# Patient Record
Sex: MALE | Race: OTHER | HISPANIC OR LATINO | Employment: FULL TIME | URBAN - METROPOLITAN AREA
[De-identification: names, ages, dates, MRNs, and addresses within clinical notes are randomized per-mention and may not be internally consistent; named-entity substitution may affect disease eponyms.]

---

## 2024-02-24 ENCOUNTER — HOSPITAL ENCOUNTER (EMERGENCY)
Facility: HOSPITAL | Age: 34
Discharge: HOME | End: 2024-02-25
Attending: EMERGENCY MEDICINE

## 2024-02-24 DIAGNOSIS — B37.49 CANDIDAL BALANO-POSTHITIS: ICD-10-CM

## 2024-02-24 DIAGNOSIS — E11.9 DIABETES MELLITUS, NEW ONSET: Primary | ICD-10-CM

## 2024-02-24 LAB
ALBUMIN SERPL-MCNC: 4.5 G/DL (ref 3.5–5)
ALBUMIN/GLOB SERPL: 1.6 RATIO (ref 1.1–2)
ALP SERPL-CCNC: 152 UNIT/L (ref 40–150)
ALT SERPL-CCNC: 32 UNIT/L (ref 0–55)
APPEARANCE UR: ABNORMAL
AST SERPL-CCNC: 19 UNIT/L (ref 5–34)
BACTERIA #/AREA URNS AUTO: ABNORMAL /HPF
BASOPHILS # BLD AUTO: 0.02 X10(3)/MCL
BASOPHILS NFR BLD AUTO: 0.4 %
BILIRUB SERPL-MCNC: 0.8 MG/DL
BILIRUB UR QL STRIP.AUTO: NEGATIVE
BUN SERPL-MCNC: 10.3 MG/DL (ref 8.9–20.6)
CALCIUM SERPL-MCNC: 9.4 MG/DL (ref 8.4–10.2)
CHLORIDE SERPL-SCNC: 94 MMOL/L (ref 98–107)
CO2 SERPL-SCNC: 23 MMOL/L (ref 22–29)
COLOR UR AUTO: ABNORMAL
CREAT SERPL-MCNC: 1.56 MG/DL (ref 0.73–1.18)
EOSINOPHIL # BLD AUTO: 0.17 X10(3)/MCL (ref 0–0.9)
EOSINOPHIL NFR BLD AUTO: 3 %
ERYTHROCYTE [DISTWIDTH] IN BLOOD BY AUTOMATED COUNT: 12.1 % (ref 11.5–17)
GFR SERPLBLD CREATININE-BSD FMLA CKD-EPI: 59 MLS/MIN/1.73/M2
GLOBULIN SER-MCNC: 2.8 GM/DL (ref 2.4–3.5)
GLUCOSE SERPL-MCNC: 766 MG/DL (ref 74–100)
GLUCOSE UR QL STRIP.AUTO: ABNORMAL
HCT VFR BLD AUTO: 40.2 % (ref 42–52)
HGB BLD-MCNC: 14.1 G/DL (ref 14–18)
HYALINE CASTS #/AREA URNS LPF: ABNORMAL /LPF
IMM GRANULOCYTES # BLD AUTO: 0.02 X10(3)/MCL (ref 0–0.04)
IMM GRANULOCYTES NFR BLD AUTO: 0.4 %
KETONES UR QL STRIP.AUTO: ABNORMAL
LEUKOCYTE ESTERASE UR QL STRIP.AUTO: NEGATIVE
LYMPHOCYTES # BLD AUTO: 2.03 X10(3)/MCL (ref 0.6–4.6)
LYMPHOCYTES NFR BLD AUTO: 36.1 %
MCH RBC QN AUTO: 29.8 PG (ref 27–31)
MCHC RBC AUTO-ENTMCNC: 35.1 G/DL (ref 33–36)
MCV RBC AUTO: 85 FL (ref 80–94)
MONOCYTES # BLD AUTO: 0.37 X10(3)/MCL (ref 0.1–1.3)
MONOCYTES NFR BLD AUTO: 6.6 %
NEUTROPHILS # BLD AUTO: 3.01 X10(3)/MCL (ref 2.1–9.2)
NEUTROPHILS NFR BLD AUTO: 53.5 %
NITRITE UR QL STRIP.AUTO: NEGATIVE
NRBC BLD AUTO-RTO: 0 %
PH UR STRIP.AUTO: 6.5 [PH]
PLATELET # BLD AUTO: 223 X10(3)/MCL (ref 130–400)
PMV BLD AUTO: 10.5 FL (ref 7.4–10.4)
POCT GLUCOSE: >500 MG/DL (ref 70–110)
POTASSIUM SERPL-SCNC: 4 MMOL/L (ref 3.5–5.1)
PROT SERPL-MCNC: 7.3 GM/DL (ref 6.4–8.3)
PROT UR QL STRIP.AUTO: NEGATIVE
RBC # BLD AUTO: 4.73 X10(6)/MCL (ref 4.7–6.1)
RBC #/AREA URNS AUTO: ABNORMAL /HPF
RBC UR QL AUTO: NEGATIVE
SODIUM SERPL-SCNC: 129 MMOL/L (ref 136–145)
SP GR UR STRIP.AUTO: 1.03 (ref 1–1.03)
SQUAMOUS #/AREA URNS LPF: ABNORMAL /HPF
UROBILINOGEN UR STRIP-ACNC: NORMAL
WBC # SPEC AUTO: 5.62 X10(3)/MCL (ref 4.5–11.5)
WBC #/AREA URNS AUTO: ABNORMAL /HPF

## 2024-02-24 PROCEDURE — 96374 THER/PROPH/DIAG INJ IV PUSH: CPT

## 2024-02-24 PROCEDURE — 25000003 PHARM REV CODE 250: Performed by: NURSE PRACTITIONER

## 2024-02-24 PROCEDURE — 96361 HYDRATE IV INFUSION ADD-ON: CPT

## 2024-02-24 PROCEDURE — 81001 URINALYSIS AUTO W/SCOPE: CPT | Performed by: NURSE PRACTITIONER

## 2024-02-24 PROCEDURE — 85025 COMPLETE CBC W/AUTO DIFF WBC: CPT | Performed by: NURSE PRACTITIONER

## 2024-02-24 PROCEDURE — 80053 COMPREHEN METABOLIC PANEL: CPT | Performed by: NURSE PRACTITIONER

## 2024-02-24 PROCEDURE — 63600175 PHARM REV CODE 636 W HCPCS: Performed by: NURSE PRACTITIONER

## 2024-02-24 PROCEDURE — 99284 EMERGENCY DEPT VISIT MOD MDM: CPT | Mod: 25

## 2024-02-24 PROCEDURE — 82010 KETONE BODYS QUAN: CPT | Performed by: NURSE PRACTITIONER

## 2024-02-24 PROCEDURE — 82962 GLUCOSE BLOOD TEST: CPT

## 2024-02-24 RX ADMIN — SODIUM CHLORIDE 1000 ML: 9 INJECTION, SOLUTION INTRAVENOUS at 11:02

## 2024-02-24 RX ADMIN — HUMAN INSULIN 10 UNITS: 100 INJECTION, SOLUTION SUBCUTANEOUS at 11:02

## 2024-02-25 VITALS
SYSTOLIC BLOOD PRESSURE: 154 MMHG | OXYGEN SATURATION: 99 % | BODY MASS INDEX: 25.71 KG/M2 | RESPIRATION RATE: 17 BRPM | WEIGHT: 160 LBS | HEIGHT: 66 IN | DIASTOLIC BLOOD PRESSURE: 88 MMHG | HEART RATE: 64 BPM | TEMPERATURE: 98 F

## 2024-02-25 LAB
B-OH-BUTYR SERPL-MCNC: 1.5 MMOL/L
HOLD SPECIMEN: NORMAL
POCT GLUCOSE: 199 MG/DL (ref 70–110)

## 2024-02-25 PROCEDURE — 82962 GLUCOSE BLOOD TEST: CPT

## 2024-02-25 RX ORDER — CLOTRIMAZOLE 1 %
CREAM (GRAM) TOPICAL 2 TIMES DAILY
Qty: 28 G | Refills: 1 | Status: SHIPPED | OUTPATIENT
Start: 2024-02-25 | End: 2024-03-17

## 2024-02-25 RX ORDER — METFORMIN HYDROCHLORIDE 500 MG/1
500 TABLET ORAL 2 TIMES DAILY WITH MEALS
Qty: 60 TABLET | Refills: 0 | Status: SHIPPED | OUTPATIENT
Start: 2024-02-25 | End: 2024-04-10 | Stop reason: SDUPTHER

## 2024-02-25 NOTE — ED PROVIDER NOTES
Encounter Date: 2/24/2024       History     Chief Complaint   Patient presents with    Testicle Pain     Pt presents to ed with reports of testicular pain x1 month, dysuria, leg cramps, and dehydration. Pt reports drinking 10-15 bottles of water daily but unable to stay hydrated. Pt states dry mouth and trouble unrinating.      The patient presents with increased thirst and urinary frequency for at least the last month. He also reports a rash to foreskin of penis. He denies testicular pain. He denies abdominal pain, nausea, vomiting, fever, and chills. Video  used for encounter.      Review of patient's allergies indicates:  No Known Allergies  History reviewed. No pertinent past medical history.  History reviewed. No pertinent surgical history.  History reviewed. No pertinent family history.     Review of Systems   Constitutional:  Negative for fever.   HENT:  Negative for sore throat.    Respiratory:  Negative for shortness of breath.    Cardiovascular:  Negative for chest pain.   Gastrointestinal:  Negative for nausea.   Genitourinary:  Positive for frequency. Negative for dysuria.   Musculoskeletal:  Negative for back pain.   Skin:  Positive for rash.   Neurological:  Negative for weakness.   Hematological:  Does not bruise/bleed easily.   All other systems reviewed and are negative.      Physical Exam     Initial Vitals [02/24/24 2059]   BP Pulse Resp Temp SpO2   (!) 166/92 62 18 97.8 °F (36.6 °C) 99 %      MAP       --         Physical Exam    Nursing note and vitals reviewed.  Constitutional: He appears well-developed and well-nourished.   HENT:   Head: Normocephalic and atraumatic.   Right Ear: Tympanic membrane normal.   Left Ear: Tympanic membrane normal.   Nose: Nose normal.   Mouth/Throat: Uvula is midline, oropharynx is clear and moist and mucous membranes are normal.   Neck: Neck supple.   Normal range of motion.  Cardiovascular:  Normal rate, regular rhythm, normal heart sounds and intact  distal pulses.           Pulmonary/Chest: Effort normal and breath sounds normal. He has no decreased breath sounds.   Abdominal: Abdomen is soft and flat. Bowel sounds are normal. There is no abdominal tenderness.   Genitourinary:    Testes normal.   Cremasteric reflex is present.    Genitourinary Comments: Erythematous rash to foreskin and glans penis     Musculoskeletal:         General: Normal range of motion.      Cervical back: Normal range of motion and neck supple.     Neurological: He is alert and oriented to person, place, and time. He has normal strength.   Skin: Skin is warm and dry.   Psychiatric: He has a normal mood and affect.         ED Course   Procedures  Labs Reviewed   COMPREHENSIVE METABOLIC PANEL - Abnormal; Notable for the following components:       Result Value    Sodium Level 129 (*)     Chloride 94 (*)     Glucose Level 766 (*)     Creatinine 1.56 (*)     Alkaline Phosphatase 152 (*)     All other components within normal limits   URINALYSIS, REFLEX TO URINE CULTURE - Abnormal; Notable for the following components:    Appearance, UA Hazy (*)     Specific Gravity, UA 1.032 (*)     Glucose, UA 4+ (*)     Ketones, UA 1+ (*)     All other components within normal limits   CBC WITH DIFFERENTIAL - Abnormal; Notable for the following components:    Hct 40.2 (*)     MPV 10.5 (*)     All other components within normal limits   POCT GLUCOSE - Abnormal; Notable for the following components:    POCT Glucose >500 (*)     All other components within normal limits   POCT GLUCOSE - Abnormal; Notable for the following components:    POCT Glucose 199 (*)     All other components within normal limits   CBC W/ AUTO DIFFERENTIAL    Narrative:     The following orders were created for panel order CBC auto differential.  Procedure                               Abnormality         Status                     ---------                               -----------         ------                     CBC with  Differential[[id number]]       Abnormal            Final result                 Please view results for these tests on the individual orders.   EXTRA TUBES    Narrative:     The following orders were created for panel order EXTRA TUBES.  Procedure                               Abnormality         Status                     ---------                               -----------         ------                     Light Blue Top Hold[[id number]]                             Final result               Light Green Top Hold[[id number]]                            Final result               Gold Top Hold[[id number]]                                   Final result                 Please view results for these tests on the individual orders.   LIGHT BLUE TOP HOLD   LIGHT GREEN TOP HOLD   GOLD TOP HOLD   BETA - HYDROXYBUTYRATE, SERUM   POCT GLUCOSE, HAND-HELD DEVICE          Imaging Results    None          Medications   sodium chloride 0.9% bolus 1,000 mL 1,000 mL (0 mLs Intravenous Stopped 2/25/24 0026)   insulin regular injection 10 Units 0.1 mL (10 Units Intravenous Given 2/24/24 2336)     Medical Decision Making  The patient presents with increased thirst and urinary frequency for at least the last month. He also reports a rash to foreskin of penis. He denies testicular pain. He denies abdominal pain, nausea, vomiting, fever, and chills. Video  used for encounter.    Blood glucose 766 - decreased to 199 after 1L NS and 10u regular insulin. Anion gap 12. Patient feels much better and wishes to go home. Dr Salguero (ER staff) consulted and he advised ok to discharge patient with close followup. Will send urgent referral to medicine clinic and start patient on metformin. Strict return to ER precautions given. Patient in full agreement with plan.    Amount and/or Complexity of Data Reviewed  Labs: ordered.    Risk  OTC drugs.               ED Course as of 02/25/24 0055   Sun Feb 25, 2024   0054 Glucose(!!): 766 [RB]    0054 POCT Glucose(!): 199 [RB]      ED Course User Index  [RB] Jermaine Pierce ACNP                           Clinical Impression:  Final diagnoses:  [E11.9] Diabetes mellitus, new onset (Primary)  [B37.49] Candidal balano-posthitis          ED Disposition Condition    Discharge Stable          ED Prescriptions       Medication Sig Dispense Start Date End Date Auth. Provider    metFORMIN (GLUCOPHAGE) 500 MG tablet Take 1 tablet (500 mg total) by mouth 2 (two) times daily with meals. 60 tablet 2/25/2024 3/26/2024 Jermaine Pierce ACNP    clotrimazole (LOTRIMIN) 1 % cream Apply topically 2 (two) times daily. Apply to affected area 2 times daily for 21 days 28 g 2/25/2024 3/17/2024 Jermaine Pierce ACNP          Follow-up Information       Follow up With Specialties Details Why Contact Info    referral sent to medicine clinic per request for a primary care provider        [facility] Emergency Medicine  If symptoms worsen [address] [facility] [place] [address] [phone]             Jermaine Pierce ACNP  02/25/24 0055

## 2024-04-10 ENCOUNTER — HOSPITAL ENCOUNTER (EMERGENCY)
Facility: HOSPITAL | Age: 34
Discharge: HOME | End: 2024-04-10
Attending: INTERNAL MEDICINE

## 2024-04-10 VITALS
HEART RATE: 61 BPM | OXYGEN SATURATION: 99 % | BODY MASS INDEX: 24.8 KG/M2 | RESPIRATION RATE: 16 BRPM | SYSTOLIC BLOOD PRESSURE: 101 MMHG | WEIGHT: 154.31 LBS | HEIGHT: 66 IN | DIASTOLIC BLOOD PRESSURE: 80 MMHG | TEMPERATURE: 98 F

## 2024-04-10 DIAGNOSIS — E11.65 UNCONTROLLED TYPE 2 DIABETES MELLITUS WITH HYPERGLYCEMIA: Primary | ICD-10-CM

## 2024-04-10 LAB
ALBUMIN SERPL-MCNC: 4.2 G/DL (ref 3.5–5)
ALBUMIN/GLOB SERPL: 1.5 RATIO (ref 1.1–2)
ALP SERPL-CCNC: 117 UNIT/L (ref 40–150)
ALT SERPL-CCNC: 19 UNIT/L (ref 0–55)
APPEARANCE UR: CLEAR
AST SERPL-CCNC: 15 UNIT/L (ref 5–34)
BACTERIA #/AREA URNS AUTO: ABNORMAL /HPF
BILIRUB SERPL-MCNC: 0.7 MG/DL
BILIRUB UR QL STRIP.AUTO: NEGATIVE
BUN SERPL-MCNC: 11 MG/DL (ref 8.9–20.6)
CALCIUM SERPL-MCNC: 9.8 MG/DL (ref 8.4–10.2)
CHLORIDE SERPL-SCNC: 98 MMOL/L (ref 98–107)
CO2 SERPL-SCNC: 25 MMOL/L (ref 22–29)
COLOR UR AUTO: COLORLESS
CREAT SERPL-MCNC: 1.31 MG/DL (ref 0.73–1.18)
EST. AVERAGE GLUCOSE BLD GHB EST-MCNC: ABNORMAL MG/DL
GFR SERPLBLD CREATININE-BSD FMLA CKD-EPI: >60 MLS/MIN/1.73/M2
GLOBULIN SER-MCNC: 2.8 GM/DL (ref 2.4–3.5)
GLUCOSE SERPL-MCNC: 687 MG/DL (ref 74–100)
GLUCOSE UR QL STRIP.AUTO: ABNORMAL
HBA1C MFR BLD: >14 %
HOLD SPECIMEN: NORMAL
KETONES UR QL STRIP.AUTO: ABNORMAL
LEUKOCYTE ESTERASE UR QL STRIP.AUTO: NEGATIVE
NITRITE UR QL STRIP.AUTO: NEGATIVE
PH UR STRIP.AUTO: 6.5 [PH]
POCT GLUCOSE: 271 MG/DL (ref 70–110)
POCT GLUCOSE: 446 MG/DL (ref 70–110)
POCT GLUCOSE: >500 MG/DL (ref 70–110)
POTASSIUM SERPL-SCNC: 4.1 MMOL/L (ref 3.5–5.1)
PROT SERPL-MCNC: 7 GM/DL (ref 6.4–8.3)
PROT UR QL STRIP.AUTO: NEGATIVE
RBC #/AREA URNS AUTO: ABNORMAL /HPF
RBC UR QL AUTO: NEGATIVE
SODIUM SERPL-SCNC: 132 MMOL/L (ref 136–145)
SP GR UR STRIP.AUTO: 1.03 (ref 1–1.03)
SQUAMOUS #/AREA URNS LPF: ABNORMAL /HPF
UROBILINOGEN UR STRIP-ACNC: NORMAL
WBC #/AREA URNS AUTO: ABNORMAL /HPF

## 2024-04-10 PROCEDURE — 99284 EMERGENCY DEPT VISIT MOD MDM: CPT | Mod: 25

## 2024-04-10 PROCEDURE — 82962 GLUCOSE BLOOD TEST: CPT

## 2024-04-10 PROCEDURE — 96372 THER/PROPH/DIAG INJ SC/IM: CPT | Performed by: INTERNAL MEDICINE

## 2024-04-10 PROCEDURE — 96361 HYDRATE IV INFUSION ADD-ON: CPT

## 2024-04-10 PROCEDURE — 80053 COMPREHEN METABOLIC PANEL: CPT | Performed by: INTERNAL MEDICINE

## 2024-04-10 PROCEDURE — 63600175 PHARM REV CODE 636 W HCPCS: Performed by: INTERNAL MEDICINE

## 2024-04-10 PROCEDURE — 96374 THER/PROPH/DIAG INJ IV PUSH: CPT

## 2024-04-10 PROCEDURE — 81001 URINALYSIS AUTO W/SCOPE: CPT | Performed by: INTERNAL MEDICINE

## 2024-04-10 PROCEDURE — 83036 HEMOGLOBIN GLYCOSYLATED A1C: CPT | Performed by: INTERNAL MEDICINE

## 2024-04-10 RX ORDER — METFORMIN HYDROCHLORIDE 500 MG/1
500 TABLET ORAL 2 TIMES DAILY WITH MEALS
Qty: 60 TABLET | Refills: 3 | Status: SHIPPED | OUTPATIENT
Start: 2024-04-10 | End: 2024-08-08

## 2024-04-10 RX ORDER — INSULIN GLARGINE 100 [IU]/ML
8 INJECTION, SOLUTION SUBCUTANEOUS NIGHTLY
Qty: 12 ML | Refills: 4 | Status: SHIPPED | OUTPATIENT
Start: 2024-04-10 | End: 2025-04-10

## 2024-04-10 RX ORDER — BLOOD SUGAR DIAGNOSTIC
1 STRIP MISCELLANEOUS NIGHTLY
Qty: 100 EACH | Refills: 4 | Status: SHIPPED | OUTPATIENT
Start: 2024-04-10

## 2024-04-10 RX ORDER — INSULIN ASPART 100 [IU]/ML
10 INJECTION, SOLUTION INTRAVENOUS; SUBCUTANEOUS
Status: COMPLETED | OUTPATIENT
Start: 2024-04-10 | End: 2024-04-10

## 2024-04-10 RX ADMIN — SODIUM CHLORIDE, POTASSIUM CHLORIDE, SODIUM LACTATE AND CALCIUM CHLORIDE 1000 ML: 600; 310; 30; 20 INJECTION, SOLUTION INTRAVENOUS at 02:04

## 2024-04-10 RX ADMIN — HUMAN INSULIN 5 UNITS: 100 INJECTION, SOLUTION SUBCUTANEOUS at 02:04

## 2024-04-10 RX ADMIN — INSULIN ASPART 10 UNITS: 100 INJECTION, SOLUTION INTRAVENOUS; SUBCUTANEOUS at 02:04

## 2024-04-10 NOTE — ED PROVIDER NOTES
Encounter Date: 4/10/2024       History     Chief Complaint   Patient presents with    Hyperglycemia    Urinary Frequency     Pt reports increase in thirst and urinary frequency. Pt is out of diabetes medication. CBG: >500     Presents with polydipsia and polyuria for the last few days, states Dx with DM last month, R/O his medication 4 days ago.     The history is provided by the patient.     Review of patient's allergies indicates:  No Known Allergies  No past medical history on file.  No past surgical history on file.  No family history on file.     Review of Systems   Constitutional:  Positive for unexpected weight change.   Endocrine: Positive for polydipsia and polyuria.   Neurological:  Positive for weakness.   All other systems reviewed and are negative.      Physical Exam     Initial Vitals [04/10/24 1329]   BP Pulse Resp Temp SpO2   (!) 144/80 84 18 98.2 °F (36.8 °C) 100 %      MAP       --         Physical Exam    Nursing note and vitals reviewed.  Constitutional: He appears well-developed and well-nourished. No distress.   HENT:   Head: Normocephalic and atraumatic.   Mouth/Throat: Oropharynx is clear and moist.   Eyes: Conjunctivae and EOM are normal. Pupils are equal, round, and reactive to light.   Neck: Neck supple. No thyromegaly present. No JVD present.   Normal range of motion.  Cardiovascular:  Normal rate, regular rhythm, normal heart sounds and intact distal pulses.           Pulmonary/Chest: Breath sounds normal. No respiratory distress.   Abdominal: Abdomen is soft. Bowel sounds are normal. He exhibits no distension. There is no abdominal tenderness. There is no rebound and no guarding.   Musculoskeletal:         General: No edema. Normal range of motion.      Cervical back: Normal range of motion and neck supple.     Neurological: He is alert and oriented to person, place, and time. He has normal strength. GCS score is 15. GCS eye subscore is 4. GCS verbal subscore is 5. GCS motor subscore  is 6.   Skin: Skin is warm and dry. No rash noted.   Psychiatric: His behavior is normal.         ED Course   Procedures  Labs Reviewed   COMPREHENSIVE METABOLIC PANEL - Abnormal; Notable for the following components:       Result Value    Sodium Level 132 (*)     Glucose Level 687 (*)     Creatinine 1.31 (*)     All other components within normal limits   HEMOGLOBIN A1C - Abnormal; Notable for the following components:    Hemoglobin A1c >14.0 (*)     All other components within normal limits   POCT GLUCOSE - Abnormal; Notable for the following components:    POCT Glucose >500 (*)     All other components within normal limits   POCT GLUCOSE - Abnormal; Notable for the following components:    POCT Glucose 446 (*)     All other components within normal limits   POCT GLUCOSE - Abnormal; Notable for the following components:    POCT Glucose 271 (*)     All other components within normal limits   URINALYSIS, REFLEX TO URINE CULTURE   EXTRA TUBES    Narrative:     The following orders were created for panel order EXTRA TUBES.  Procedure                               Abnormality         Status                     ---------                               -----------         ------                     Light Blue Top Hold[[id number]]                             In process                 Lavender Top Hold[[id number]]                               In process                 Gold Top Hold[[id number]]                                   In process                 Pink Top Hold[[id number]]                                   In process                   Please view results for these tests on the individual orders.   LIGHT BLUE TOP HOLD   LAVENDER TOP HOLD   GOLD TOP HOLD   PINK TOP HOLD   POCT GLUCOSE, HAND-HELD DEVICE          Imaging Results    None          Medications   lactated ringers bolus 1,000 mL (0 mLs Intravenous Stopped 4/10/24 1433)   insulin aspart U-100 injection 10 Units (10 Units Subcutaneous Given 4/10/24 1403)  "  lactated ringers bolus 1,000 mL (0 mLs Intravenous Stopped 4/10/24 1523)   insulin regular injection 5 Units 0.05 mL (5 Units Intravenous Given 4/10/24 1452)     Medical Decision Making  Amount and/or Complexity of Data Reviewed  Labs: ordered. Decision-making details documented in ED Course.    Risk  OTC drugs.  Prescription drug management.      Additional MDM:   Differential Diagnosis:   U-DM, DKA, Hyperosmolar state, Infection, among others                                      Clinical Impression:  Final diagnoses:  [E11.65] Uncontrolled type 2 diabetes mellitus with hyperglycemia (Primary)          ED Disposition Condition    Discharge Stable          ED Prescriptions       Medication Sig Dispense Start Date End Date Auth. Provider    metFORMIN (GLUCOPHAGE) 500 MG tablet Take 1 tablet (500 mg total) by mouth 2 (two) times daily with meals. 60 tablet 4/10/2024 8/8/2024 Raul De Paz MD    insulin (LANTUS SOLOSTAR U-100 INSULIN) glargine 100 units/mL SubQ pen Inject 8 Units into the skin every evening. 12 mL 4/10/2024 4/10/2025 Raul De Paz MD    pen needle, diabetic (BD ULTRA-FINE MICRO PEN NEEDLE) 32 gauge x 1/4" Ndle 1 each by Misc.(Non-Drug; Combo Route) route every evening. 100 each 4/10/2024 -- Raul De Paz MD          Follow-up Information       Follow up With Specialties Details Why Contact Info Additional Information    [facility] Emergency Medicine  If symptoms worsen [address] [facility] [address] [phone]     [facility] Internal Medicine Schedule an appointment as soon as possible for a visit in 1 month  [address] [facility] [address] [phone] Internal Medicine Clinic Entrance #1             Raul De Paz MD  04/10/24 2647    "

## 2024-12-09 ENCOUNTER — HOSPITAL ENCOUNTER (INPATIENT)
Facility: HOSPITAL | Age: 34
LOS: 2 days | Discharge: HOME | DRG: 638 | End: 2024-12-12
Attending: FAMILY MEDICINE | Admitting: INTERNAL MEDICINE

## 2024-12-09 DIAGNOSIS — N17.9 AKI (ACUTE KIDNEY INJURY): ICD-10-CM

## 2024-12-09 DIAGNOSIS — E11.65 UNCONTROLLED TYPE 2 DIABETES MELLITUS WITH HYPERGLYCEMIA: Primary | ICD-10-CM

## 2024-12-09 DIAGNOSIS — E11.00 HYPEROSMOLAR HYPERGLYCEMIC STATE (HHS): ICD-10-CM

## 2024-12-09 DIAGNOSIS — E86.0 DEHYDRATION: ICD-10-CM

## 2024-12-09 DIAGNOSIS — E11.00 TYPE 2 DIABETES MELLITUS WITH HYPEROSMOLAR HYPERGLYCEMIC STATE (HHS): ICD-10-CM

## 2024-12-09 LAB
A-ADO2 BLOOD GAS (OHS): 12 MMHG
ALBUMIN SERPL-MCNC: 4.6 G/DL (ref 3.5–5)
ALBUMIN/GLOB SERPL: 1.4 RATIO (ref 1.1–2)
ALLENS TEST BLOOD GAS (OHS): YES
ALP SERPL-CCNC: 187 UNIT/L (ref 40–150)
ALT SERPL-CCNC: 27 UNIT/L (ref 0–55)
ANION GAP SERPL CALC-SCNC: 24 MEQ/L
AST SERPL-CCNC: 15 UNIT/L (ref 5–34)
B-OH-BUTYR SERPL-MCNC: 7 MMOL/L
BACTERIA #/AREA URNS AUTO: ABNORMAL /HPF
BASE EXCESS BLD CALC-SCNC: -5.2 MMOL/L (ref -2–2)
BASOPHILS # BLD AUTO: 0.05 X10(3)/MCL
BASOPHILS NFR BLD AUTO: 0.8 %
BILIRUB SERPL-MCNC: 0.7 MG/DL
BILIRUB UR QL STRIP.AUTO: NEGATIVE
BLOOD GAS SAMPLE TYPE (OHS): ABNORMAL
BUN SERPL-MCNC: 20.7 MG/DL (ref 8.9–20.6)
CALCIUM SERPL-MCNC: 10.8 MG/DL (ref 8.4–10.2)
CHLORIDE SERPL-SCNC: 87 MMOL/L (ref 98–107)
CLARITY UR: CLEAR
CO2 BLDA-SCNC: 21.1 MMOL/L (ref 22–26)
CO2 SERPL-SCNC: 20 MMOL/L (ref 22–29)
COHGB MFR BLDA: 0.3 % (ref 0.5–1.5)
COLOR UR AUTO: COLORLESS
CREAT SERPL-MCNC: 1.91 MG/DL (ref 0.72–1.25)
CREAT/UREA NIT SERPL: 11
DRAWN BY BLOOD GAS (OHS): ABNORMAL
EOSINOPHIL # BLD AUTO: 0.02 X10(3)/MCL (ref 0–0.9)
EOSINOPHIL NFR BLD AUTO: 0.3 %
ERYTHROCYTE [DISTWIDTH] IN BLOOD BY AUTOMATED COUNT: 11.9 % (ref 11.5–17)
EST. AVERAGE GLUCOSE BLD GHB EST-MCNC: ABNORMAL MG/DL
FLUAV AG UPPER RESP QL IA.RAPID: NOT DETECTED
FLUBV AG UPPER RESP QL IA.RAPID: NOT DETECTED
GAS PNL BLD: 103 MMHG
GFR SERPLBLD CREATININE-BSD FMLA CKD-EPI: 46 ML/MIN/1.73/M2
GLOBULIN SER-MCNC: 3.3 GM/DL (ref 2.4–3.5)
GLUCOSE SERPL-MCNC: 925 MG/DL (ref 74–100)
GLUCOSE UR QL STRIP: ABNORMAL
HBA1C MFR BLD: >14 %
HCO3 BLDA-SCNC: 20 MMOL/L (ref 22–26)
HCT VFR BLD AUTO: 46.5 % (ref 42–52)
HGB BLD-MCNC: 16.2 G/DL (ref 14–18)
HGB UR QL STRIP: NEGATIVE
HYALINE CASTS #/AREA URNS LPF: ABNORMAL /LPF
IMM GRANULOCYTES # BLD AUTO: 0.02 X10(3)/MCL (ref 0–0.04)
IMM GRANULOCYTES NFR BLD AUTO: 0.3 %
INHALED O2 CONCENTRATION: 21 %
KETONES UR QL STRIP: ABNORMAL
LEUKOCYTE ESTERASE UR QL STRIP: NEGATIVE
LIPASE SERPL-CCNC: 25 U/L
LYMPHOCYTES # BLD AUTO: 2.08 X10(3)/MCL (ref 0.6–4.6)
LYMPHOCYTES NFR BLD AUTO: 33.7 %
MAGNESIUM SERPL-MCNC: 2.4 MG/DL (ref 1.6–2.6)
MCH RBC QN AUTO: 29.9 PG (ref 27–31)
MCHC RBC AUTO-ENTMCNC: 34.8 G/DL (ref 33–36)
MCV RBC AUTO: 85.8 FL (ref 80–94)
METHGB MFR BLDA: 0.5 % (ref 0–1.5)
MONOCYTES # BLD AUTO: 0.55 X10(3)/MCL (ref 0.1–1.3)
MONOCYTES NFR BLD AUTO: 8.9 %
NEUTROPHILS # BLD AUTO: 3.45 X10(3)/MCL (ref 2.1–9.2)
NEUTROPHILS NFR BLD AUTO: 56 %
NITRITE UR QL STRIP: NEGATIVE
NRBC BLD AUTO-RTO: 0 %
O2 HB BLOOD GAS (OHS): 95.2 % (ref 94–100)
OXYHGB MFR BLDA: 14.4 G/DL (ref 12–18)
PCO2 BLDA: 37 MMHG (ref 35–45)
PH BLDA: 7.37 [PH] (ref 7.35–7.45)
PH UR STRIP: 5 [PH]
PLATELET # BLD AUTO: 282 X10(3)/MCL (ref 130–400)
PMV BLD AUTO: 10.1 FL (ref 7.4–10.4)
PO2 BLDA: 91 MMHG (ref 75–100)
POCT GLUCOSE: >500 MG/DL (ref 70–110)
POTASSIUM SERPL-SCNC: 4.5 MMOL/L (ref 3.5–5.1)
PROT SERPL-MCNC: 7.9 GM/DL (ref 6.4–8.3)
PROT UR QL STRIP: NEGATIVE
RBC # BLD AUTO: 5.42 X10(6)/MCL (ref 4.7–6.1)
RBC #/AREA URNS AUTO: ABNORMAL /HPF
RSV A 5' UTR RNA NPH QL NAA+PROBE: NOT DETECTED
SAMPLE SITE BLOOD GAS (OHS): ABNORMAL
SAO2 % BLDA: 103 %
SARS-COV-2 RNA RESP QL NAA+PROBE: NOT DETECTED
SODIUM SERPL-SCNC: 131 MMOL/L (ref 136–145)
SP GR UR STRIP.AUTO: 1.03 (ref 1–1.03)
SQUAMOUS #/AREA URNS LPF: ABNORMAL /HPF
UROBILINOGEN UR STRIP-ACNC: NORMAL
WBC # BLD AUTO: 6.17 X10(3)/MCL (ref 4.5–11.5)
WBC #/AREA URNS AUTO: ABNORMAL /HPF

## 2024-12-09 PROCEDURE — 94761 N-INVAS EAR/PLS OXIMETRY MLT: CPT | Mod: XB

## 2024-12-09 PROCEDURE — 96361 HYDRATE IV INFUSION ADD-ON: CPT

## 2024-12-09 PROCEDURE — 63600175 PHARM REV CODE 636 W HCPCS: Performed by: FAMILY MEDICINE

## 2024-12-09 PROCEDURE — 99900035 HC TECH TIME PER 15 MIN (STAT)

## 2024-12-09 PROCEDURE — 82010 KETONE BODYS QUAN: CPT | Performed by: FAMILY MEDICINE

## 2024-12-09 PROCEDURE — 81001 URINALYSIS AUTO W/SCOPE: CPT | Performed by: FAMILY MEDICINE

## 2024-12-09 PROCEDURE — 99285 EMERGENCY DEPT VISIT HI MDM: CPT | Mod: 25

## 2024-12-09 PROCEDURE — 83690 ASSAY OF LIPASE: CPT | Performed by: FAMILY MEDICINE

## 2024-12-09 PROCEDURE — 83735 ASSAY OF MAGNESIUM: CPT | Performed by: FAMILY MEDICINE

## 2024-12-09 PROCEDURE — 36600 WITHDRAWAL OF ARTERIAL BLOOD: CPT

## 2024-12-09 PROCEDURE — 82962 GLUCOSE BLOOD TEST: CPT

## 2024-12-09 PROCEDURE — 85025 COMPLETE CBC W/AUTO DIFF WBC: CPT | Performed by: FAMILY MEDICINE

## 2024-12-09 PROCEDURE — 83036 HEMOGLOBIN GLYCOSYLATED A1C: CPT | Performed by: FAMILY MEDICINE

## 2024-12-09 PROCEDURE — 82803 BLOOD GASES ANY COMBINATION: CPT

## 2024-12-09 PROCEDURE — 0241U COVID/RSV/FLU A&B PCR: CPT | Performed by: FAMILY MEDICINE

## 2024-12-09 PROCEDURE — 80053 COMPREHEN METABOLIC PANEL: CPT | Performed by: FAMILY MEDICINE

## 2024-12-09 PROCEDURE — 96360 HYDRATION IV INFUSION INIT: CPT

## 2024-12-09 PROCEDURE — 25000003 PHARM REV CODE 250: Performed by: FAMILY MEDICINE

## 2024-12-09 RX ORDER — SODIUM CHLORIDE 0.9 % (FLUSH) 0.9 %
10 SYRINGE (ML) INJECTION
Status: DISCONTINUED | OUTPATIENT
Start: 2024-12-10 | End: 2024-12-10

## 2024-12-09 RX ORDER — POTASSIUM CHLORIDE 7.45 MG/ML
40 INJECTION INTRAVENOUS
Status: DISCONTINUED | OUTPATIENT
Start: 2024-12-10 | End: 2024-12-12 | Stop reason: HOSPADM

## 2024-12-09 RX ORDER — SODIUM CHLORIDE 9 MG/ML
1000 INJECTION, SOLUTION INTRAVENOUS CONTINUOUS
Status: ACTIVE | OUTPATIENT
Start: 2024-12-10 | End: 2024-12-10

## 2024-12-09 RX ORDER — POTASSIUM CHLORIDE 7.45 MG/ML
60 INJECTION INTRAVENOUS
Status: DISCONTINUED | OUTPATIENT
Start: 2024-12-10 | End: 2024-12-12 | Stop reason: HOSPADM

## 2024-12-09 RX ORDER — DEXTROSE MONOHYDRATE AND SODIUM CHLORIDE 5; .45 G/100ML; G/100ML
INJECTION, SOLUTION INTRAVENOUS CONTINUOUS PRN
Status: DISCONTINUED | OUTPATIENT
Start: 2024-12-10 | End: 2024-12-10

## 2024-12-09 RX ORDER — POTASSIUM CHLORIDE 7.45 MG/ML
80 INJECTION INTRAVENOUS
Status: DISCONTINUED | OUTPATIENT
Start: 2024-12-10 | End: 2024-12-12 | Stop reason: HOSPADM

## 2024-12-09 RX ADMIN — HUMAN INSULIN 6 UNITS: 100 INJECTION, SOLUTION SUBCUTANEOUS at 10:12

## 2024-12-09 RX ADMIN — SODIUM CHLORIDE 1000 ML: 9 INJECTION, SOLUTION INTRAVENOUS at 10:12

## 2024-12-09 RX ADMIN — SODIUM CHLORIDE 1000 ML: 9 INJECTION, SOLUTION INTRAVENOUS at 09:12

## 2024-12-10 PROBLEM — R13.10 DYSPHAGIA: Status: ACTIVE | Noted: 2024-12-10

## 2024-12-10 PROBLEM — R63.4 WEIGHT LOSS: Status: ACTIVE | Noted: 2024-12-10

## 2024-12-10 PROBLEM — E11.00 HYPEROSMOLAR HYPERGLYCEMIC STATE (HHS): Status: ACTIVE | Noted: 2024-12-10

## 2024-12-10 PROBLEM — E44.0 MODERATE MALNUTRITION: Status: ACTIVE | Noted: 2024-12-10

## 2024-12-10 PROBLEM — E11.9 TYPE 2 DIABETES MELLITUS WITHOUT COMPLICATION, WITHOUT LONG-TERM CURRENT USE OF INSULIN: Status: ACTIVE | Noted: 2024-12-10

## 2024-12-10 LAB
ANION GAP SERPL CALC-SCNC: 10 MEQ/L
ANION GAP SERPL CALC-SCNC: 10 MEQ/L
ANION GAP SERPL CALC-SCNC: 4 MEQ/L
ANION GAP SERPL CALC-SCNC: 8 MEQ/L
BASOPHILS # BLD AUTO: 0.04 X10(3)/MCL
BASOPHILS NFR BLD AUTO: 0.6 %
BUN SERPL-MCNC: 13.1 MG/DL (ref 8.9–20.6)
BUN SERPL-MCNC: 13.1 MG/DL (ref 8.9–20.6)
BUN SERPL-MCNC: 13.5 MG/DL (ref 8.9–20.6)
BUN SERPL-MCNC: 13.8 MG/DL (ref 8.9–20.6)
CALCIUM SERPL-MCNC: 9.1 MG/DL (ref 8.4–10.2)
CALCIUM SERPL-MCNC: 9.2 MG/DL (ref 8.4–10.2)
CALCIUM SERPL-MCNC: 9.2 MG/DL (ref 8.4–10.2)
CALCIUM SERPL-MCNC: 9.3 MG/DL (ref 8.4–10.2)
CHLORIDE SERPL-SCNC: 106 MMOL/L (ref 98–107)
CHLORIDE SERPL-SCNC: 107 MMOL/L (ref 98–107)
CHLORIDE SERPL-SCNC: 107 MMOL/L (ref 98–107)
CHLORIDE SERPL-SCNC: 108 MMOL/L (ref 98–107)
CO2 SERPL-SCNC: 27 MMOL/L (ref 22–29)
CO2 SERPL-SCNC: 28 MMOL/L (ref 22–29)
CO2 SERPL-SCNC: 28 MMOL/L (ref 22–29)
CO2 SERPL-SCNC: 31 MMOL/L (ref 22–29)
CREAT SERPL-MCNC: 0.86 MG/DL (ref 0.72–1.25)
CREAT SERPL-MCNC: 0.96 MG/DL (ref 0.72–1.25)
CREAT SERPL-MCNC: 1.02 MG/DL (ref 0.72–1.25)
CREAT SERPL-MCNC: 1.06 MG/DL (ref 0.72–1.25)
CREAT/UREA NIT SERPL: 12
CREAT/UREA NIT SERPL: 14
CREAT/UREA NIT SERPL: 14
CREAT/UREA NIT SERPL: 15
EOSINOPHIL # BLD AUTO: 0.04 X10(3)/MCL (ref 0–0.9)
EOSINOPHIL NFR BLD AUTO: 0.6 %
ERYTHROCYTE [DISTWIDTH] IN BLOOD BY AUTOMATED COUNT: 11.8 % (ref 11.5–17)
GFR SERPLBLD CREATININE-BSD FMLA CKD-EPI: >60 ML/MIN/1.73/M2
GLUCOSE SERPL-MCNC: 155 MG/DL (ref 74–100)
GLUCOSE SERPL-MCNC: 179 MG/DL (ref 74–100)
GLUCOSE SERPL-MCNC: 220 MG/DL (ref 74–100)
GLUCOSE SERPL-MCNC: 260 MG/DL (ref 74–100)
HCT VFR BLD AUTO: 39.1 % (ref 42–52)
HGB BLD-MCNC: 14.4 G/DL (ref 14–18)
HOLD SPECIMEN: NORMAL
IMM GRANULOCYTES # BLD AUTO: 0.03 X10(3)/MCL (ref 0–0.04)
IMM GRANULOCYTES NFR BLD AUTO: 0.4 %
LYMPHOCYTES # BLD AUTO: 2.63 X10(3)/MCL (ref 0.6–4.6)
LYMPHOCYTES NFR BLD AUTO: 38 %
MCH RBC QN AUTO: 30.9 PG (ref 27–31)
MCHC RBC AUTO-ENTMCNC: 36.8 G/DL (ref 33–36)
MCV RBC AUTO: 83.9 FL (ref 80–94)
MONOCYTES # BLD AUTO: 0.53 X10(3)/MCL (ref 0.1–1.3)
MONOCYTES NFR BLD AUTO: 7.7 %
NEUTROPHILS # BLD AUTO: 3.65 X10(3)/MCL (ref 2.1–9.2)
NEUTROPHILS NFR BLD AUTO: 52.7 %
NRBC BLD AUTO-RTO: 0.3 %
PHOSPHATE SERPL-MCNC: 3.1 MG/DL (ref 2.3–4.7)
PHOSPHATE SERPL-MCNC: 3.1 MG/DL (ref 2.3–4.7)
PHOSPHATE SERPL-MCNC: 3.2 MG/DL (ref 2.3–4.7)
PHOSPHATE SERPL-MCNC: 3.3 MG/DL (ref 2.3–4.7)
PHOSPHATE SERPL-MCNC: 5.4 MG/DL (ref 2.3–4.7)
PLATELET # BLD AUTO: 254 X10(3)/MCL (ref 130–400)
PMV BLD AUTO: 10.1 FL (ref 7.4–10.4)
POCT GLUCOSE: 141 MG/DL (ref 70–110)
POCT GLUCOSE: 152 MG/DL (ref 70–110)
POCT GLUCOSE: 173 MG/DL (ref 70–110)
POCT GLUCOSE: 177 MG/DL (ref 70–110)
POCT GLUCOSE: 206 MG/DL (ref 70–110)
POCT GLUCOSE: 220 MG/DL (ref 70–110)
POCT GLUCOSE: 240 MG/DL (ref 70–110)
POCT GLUCOSE: 249 MG/DL (ref 70–110)
POCT GLUCOSE: 263 MG/DL (ref 70–110)
POCT GLUCOSE: 270 MG/DL (ref 70–110)
POCT GLUCOSE: 311 MG/DL (ref 70–110)
POCT GLUCOSE: 315 MG/DL (ref 70–110)
POCT GLUCOSE: 319 MG/DL (ref 70–110)
POCT GLUCOSE: 360 MG/DL (ref 70–110)
POCT GLUCOSE: 394 MG/DL (ref 70–110)
POTASSIUM SERPL-SCNC: 2.8 MMOL/L (ref 3.5–5.1)
POTASSIUM SERPL-SCNC: 2.9 MMOL/L (ref 3.5–5.1)
POTASSIUM SERPL-SCNC: 3.2 MMOL/L (ref 3.5–5.1)
POTASSIUM SERPL-SCNC: 4.1 MMOL/L (ref 3.5–5.1)
RBC # BLD AUTO: 4.66 X10(6)/MCL (ref 4.7–6.1)
SODIUM SERPL-SCNC: 141 MMOL/L (ref 136–145)
SODIUM SERPL-SCNC: 142 MMOL/L (ref 136–145)
SODIUM SERPL-SCNC: 145 MMOL/L (ref 136–145)
SODIUM SERPL-SCNC: 146 MMOL/L (ref 136–145)
WBC # BLD AUTO: 6.92 X10(3)/MCL (ref 4.5–11.5)

## 2024-12-10 PROCEDURE — 63600175 PHARM REV CODE 636 W HCPCS

## 2024-12-10 PROCEDURE — 84100 ASSAY OF PHOSPHORUS: CPT

## 2024-12-10 PROCEDURE — 85025 COMPLETE CBC W/AUTO DIFF WBC: CPT

## 2024-12-10 PROCEDURE — S5010 5% DEXTROSE AND 0.45% SALINE: HCPCS | Performed by: FAMILY MEDICINE

## 2024-12-10 PROCEDURE — 25000003 PHARM REV CODE 250: Performed by: FAMILY MEDICINE

## 2024-12-10 PROCEDURE — 80048 BASIC METABOLIC PNL TOTAL CA: CPT

## 2024-12-10 PROCEDURE — 11000001 HC ACUTE MED/SURG PRIVATE ROOM

## 2024-12-10 PROCEDURE — 25000003 PHARM REV CODE 250: Performed by: INTERNAL MEDICINE

## 2024-12-10 PROCEDURE — 94761 N-INVAS EAR/PLS OXIMETRY MLT: CPT

## 2024-12-10 PROCEDURE — 92610 EVALUATE SWALLOWING FUNCTION: CPT

## 2024-12-10 PROCEDURE — 36415 COLL VENOUS BLD VENIPUNCTURE: CPT

## 2024-12-10 PROCEDURE — 25000003 PHARM REV CODE 250

## 2024-12-10 RX ORDER — SODIUM CHLORIDE 0.9 % (FLUSH) 0.9 %
10 SYRINGE (ML) INJECTION
Status: DISCONTINUED | OUTPATIENT
Start: 2024-12-10 | End: 2024-12-12 | Stop reason: HOSPADM

## 2024-12-10 RX ORDER — SODIUM CHLORIDE 9 MG/ML
1000 INJECTION, SOLUTION INTRAVENOUS CONTINUOUS
Status: DISCONTINUED | OUTPATIENT
Start: 2024-12-10 | End: 2024-12-10

## 2024-12-10 RX ORDER — INSULIN ASPART 100 [IU]/ML
8 INJECTION, SOLUTION INTRAVENOUS; SUBCUTANEOUS
Status: DISCONTINUED | OUTPATIENT
Start: 2024-12-10 | End: 2024-12-11

## 2024-12-10 RX ORDER — INSULIN ASPART 100 [IU]/ML
0-10 INJECTION, SOLUTION INTRAVENOUS; SUBCUTANEOUS
Status: DISCONTINUED | OUTPATIENT
Start: 2024-12-10 | End: 2024-12-12 | Stop reason: HOSPADM

## 2024-12-10 RX ORDER — ONDANSETRON HYDROCHLORIDE 2 MG/ML
4 INJECTION, SOLUTION INTRAVENOUS EVERY 6 HOURS PRN
Status: DISCONTINUED | OUTPATIENT
Start: 2024-12-10 | End: 2024-12-12 | Stop reason: HOSPADM

## 2024-12-10 RX ORDER — INSULIN GLARGINE 100 [IU]/ML
26 INJECTION, SOLUTION SUBCUTANEOUS 2 TIMES DAILY
Status: DISCONTINUED | OUTPATIENT
Start: 2024-12-10 | End: 2024-12-12 | Stop reason: HOSPADM

## 2024-12-10 RX ORDER — SODIUM CHLORIDE 0.9 % (FLUSH) 0.9 %
10 SYRINGE (ML) INJECTION
Status: DISCONTINUED | OUTPATIENT
Start: 2024-12-10 | End: 2024-12-10

## 2024-12-10 RX ORDER — MUPIROCIN 20 MG/G
OINTMENT TOPICAL 2 TIMES DAILY
Status: DISCONTINUED | OUTPATIENT
Start: 2024-12-10 | End: 2024-12-12 | Stop reason: HOSPADM

## 2024-12-10 RX ORDER — IBUPROFEN 200 MG
16 TABLET ORAL
Status: DISCONTINUED | OUTPATIENT
Start: 2024-12-10 | End: 2024-12-12 | Stop reason: HOSPADM

## 2024-12-10 RX ORDER — GLUCAGON 1 MG
1 KIT INJECTION
Status: DISCONTINUED | OUTPATIENT
Start: 2024-12-10 | End: 2024-12-12 | Stop reason: HOSPADM

## 2024-12-10 RX ORDER — POTASSIUM CHLORIDE 20 MEQ/1
40 TABLET, EXTENDED RELEASE ORAL ONCE
Status: COMPLETED | OUTPATIENT
Start: 2024-12-10 | End: 2024-12-10

## 2024-12-10 RX ORDER — IBUPROFEN 200 MG
24 TABLET ORAL
Status: DISCONTINUED | OUTPATIENT
Start: 2024-12-10 | End: 2024-12-12 | Stop reason: HOSPADM

## 2024-12-10 RX ADMIN — POTASSIUM CHLORIDE 40 MEQ: 1500 TABLET, EXTENDED RELEASE ORAL at 01:12

## 2024-12-10 RX ADMIN — SODIUM CHLORIDE 1000 ML: 9 INJECTION, SOLUTION INTRAVENOUS at 12:12

## 2024-12-10 RX ADMIN — MUPIROCIN: 20 OINTMENT TOPICAL at 08:12

## 2024-12-10 RX ADMIN — INSULIN HUMAN 0.1 UNITS/KG/HR: 1 INJECTION, SOLUTION INTRAVENOUS at 12:12

## 2024-12-10 RX ADMIN — DEXTROSE AND SODIUM CHLORIDE: 5; 450 INJECTION, SOLUTION INTRAVENOUS at 03:12

## 2024-12-10 RX ADMIN — POTASSIUM BICARBONATE 50 MEQ: 977.5 TABLET, EFFERVESCENT ORAL at 08:12

## 2024-12-10 RX ADMIN — INSULIN ASPART 4 UNITS: 100 INJECTION, SOLUTION INTRAVENOUS; SUBCUTANEOUS at 04:12

## 2024-12-10 RX ADMIN — INSULIN GLARGINE 26 UNITS: 100 INJECTION, SOLUTION SUBCUTANEOUS at 09:12

## 2024-12-10 RX ADMIN — INSULIN ASPART 8 UNITS: 100 INJECTION, SOLUTION INTRAVENOUS; SUBCUTANEOUS at 04:12

## 2024-12-10 RX ADMIN — INSULIN GLARGINE 26 UNITS: 100 INJECTION, SOLUTION SUBCUTANEOUS at 12:12

## 2024-12-10 RX ADMIN — INSULIN ASPART 3 UNITS: 100 INJECTION, SOLUTION INTRAVENOUS; SUBCUTANEOUS at 09:12

## 2024-12-10 NOTE — PLAN OF CARE
12/10/24 1109   Discharge Assessment   Assessment Type Discharge Planning Assessment   Confirmed/corrected address, phone number and insurance Yes   Confirmed Demographics Correct on Facesheet   Source of Information patient; utilized   Reason For Admission Dehydration, JYOTI, Uncontrolled type 2 diabetes mellitus with hyperglycemia   People in Home friend(s)   Facility Arrived From: Home   Do you expect to return to your current living situation? Yes   Do you have help at home or someone to help you manage your care at home? Yes   Who are your caregiver(s) and their phone number(s)? Arian Orona (Brother)  [phone]   Prior to hospitilization cognitive status: Alert/Oriented   Current cognitive status: Alert/Oriented   Dressing/Bathing Difficulty no   Home Layout Able to live on 1st floor   Equipment Currently Used at Home none   Readmission within 30 days? No   Patient currently being followed by outpatient case management? No   Do you currently have service(s) that help you manage your care at home? No   Do you take prescription medications? Yes  ([facility])   Do you have prescription coverage? No   Do you have any problems affording any of your prescribed medications? TBD   Who is going to help you get home at discharge? Friend   How do you get to doctors appointments? car, drives self   Are you on dialysis? No   Discharge Plan A Home   DME Needed Upon Discharge  none   Discharge Plan discussed with: Patient   Transition of Care Barriers Unisured   Physical Activity   On average, how many days per week do you engage in moderate to strenuous exercise (like a brisk walk)? 5 days   On average, how many minutes do you engage in exercise at this level? 150+ min   Financial Resource Strain   How hard is it for you to pay for the very basics like food, housing, medical care, and heating? Not hard   Housing Stability   In the last 12 months, was there a time when you were not able to pay  the mortgage or rent on time? N   Transportation Needs   Has the lack of transportation kept you from medical appointments, meetings, work or from getting things needed for daily living? No   Food Insecurity   Within the past 12 months, you worried that your food would run out before you got the money to buy more. Never true   Within the past 12 months, the food you bought just didn't last and you didn't have money to get more. Never true   Utilities   In the past 12 months has the electric, gas, oil, or water company threatened to shut off services in your home? No   Health Literacy   How often do you need to have someone help you when you read instructions, pamphlets, or other written material from your doctor or pharmacy? Sometimes   OTHER   Name(s) of People in Home Friends     Pt immigrated from [place] 7 yrs ago; Spouse & 3 children remain in country of origin; Pt resides with friends who all work together in construction; Emergency contact is brother, Arian Orona ([phone]); Pt independent with ADL's; Undocumented/Uninsured/No SS #; CM to follow.

## 2024-12-10 NOTE — PLAN OF CARE
Problem: Adult Inpatient Plan of Care  Goal: Plan of Care Review  Outcome: Progressing  Goal: Optimal Comfort and Wellbeing  Outcome: Progressing     Problem: Diabetes Comorbidity  Goal: Blood Glucose Level Within Targeted Range  Outcome: Not Progressing

## 2024-12-10 NOTE — ED NOTES
Dr. Bueno and Dr. Shermna notified of CBG greater than 500. NS currently infusing, VSS. Patient in no acute distress at this time.   
Dr. Bueno notified of CBG remaining greater than 500 at this time.   
-2

## 2024-12-10 NOTE — PT/OT/SLP EVAL
"[facility]  Cranial Nerve Exam and Clinical Swallow Exam  Initial        Name: Roman Najera   MRN: [id number]    Therapy Diagnosis: oropharyngeal swallow appears WFL    Physician: Elkin Moore DO     Date of Evaluation:  12/10/2024    Speech Start Time:  0900  Speech Stop Time:  0920     Speech Total Time (min):  20 min    Billable Minutes: Eval Swallow and Oral Function 20    Procedure Min.   Swallow and Oral Function Evaluation   20     Recommendations:     Consistency Recommendations: Regular (IDDSI level 7) consistency diet with Thin (IDDSI Level 0) liquids  Precautions:sit as upright as possible, upright 30 minutes after meals, and frequent oral care  Risk Management: use good oral hygiene , sit upright for all PO intake, and increase physical mobility as tolerated  Specialist Referrals: GI  Ancillary Tests:   Therapy: Dysphagia therapy is not recommended at this time.  Frequency: follow up for diet tolerance and safety  Follow-up exam: monitor if objective assessment warranted.(MBSS)  Discharge disposition: No therapy indicated at this time    Subjective:       Chief Complaint: Hyperosmolar hyperglycemic state    Active Ambulatory Problems     Diagnosis Date Noted    No Active Ambulatory Problems     Resolved Ambulatory Problems     Diagnosis Date Noted    No Resolved Ambulatory Problems     No Additional Past Medical History          HISTORY & PHYSICAL:  Per medical record: "Roman Najera is a 34-year-old male past medical history type 2 diabetes who presented to the ED with complaints of nausea and vomiting.  Patient is started having nausea nonbloody vomiting 3 days ago with loss of appetite and cramping epigastric pain radiating to left lower quadrant with subjective fevers.  Denied any diarrhea, constipation, or current bloody stools, though he recently has been having black stool for 3 months that has resolved.  He has also been complaining of difficulty swallowing " "and food getting stuck that passes with water followed by burning chest pain.  He has lost around 30 kg over the last 8 months, during the same time he has been complaining of fatigue.  Patient is supposed to be on metformin and Lantus, he stated that he is not taking any medication.  He denied any smoking, alcohol, or drug abuse. No family history of cancers." Speech pathology consulted to assess swallow via clinical swallow evaluation.        PREVIOUS SPEECH THERAPY:    N/a      General Information:  Affect: Alert  Cooperative  Oxygen:  room air  Hearing: WFL  Orientation:Ox4  Language line: Priyanka [id number]        Objective:       Cranial Nerve 5: Trigeminal Nerve  Motor Jaw Posture at rest: Closed  Mandible Elevation/Depression: WFL  Mandible lateralization: WFL  Abnormal movement: absent Interpretation:   intact   Sensory Forehead: WFL  Cheek: WFL  Jaw: WFL  Facial Pain: None noted Interpretation:   intact     Cranial Nerve 7: Facial Nerve  Motor Facial Symmetry: WNL  Wrinkle Forehead: WFL  Close eyes tightly: WFL  Labial Protrusion: WFL  Labial Retraction: WFL  Buccal Strength with Labial Seal: WFL  Abnormal movement: absent Interpretation:   intact   Sensory Formal testing not completed. Patient denied any changes in taste      Cranial Nerves IX and X: Glossopharyngeal and Vagus Nerves  Motor Palatal Symmetry (Rest): WNL  Palatal Symmetry (Movement): WNL  Cough: Perceptually strong  Voice Prior to PO intake: Clear  Resonance: Normal  Abnormal movement: absent Interpretation:   intact     Cranial Nerve XII: Hypoglossal Nerve  Motor Tongue at rest: WNL  Lingual Protrusion: WNL  Lingual Protrusion against Resistance: WNL  Lingual Lateralization: WNL  Abnormal movement: absent Interpretation:   intact     Other information:  Volitional Swallow: Able to palpate laryngeal rise  Mucosal Quality: No abnormal findings  Secretion Management: Secretion Mgmt: adequate  Dentition: Good condition for speech and mastication "     Predisposing dysphagia risk factors: globus sensation  Clinical signs of possible chronic dysphagia: no overt s/sx of airway invasion  Precipitating dysphagia risk factors:     Pain:   0/10  Pain Location / Description: no pain reported      Assessment :     [place] Swallow Protocol:  The [place] Swallow Protocol was administered. The patient was alert and provided the instructions prior to the beginning of the protocol. The patient consumed 3 oz before putting the cup down. Patient drank with consecutive swallows. Patient with no cough, no throat clear, no wet vocal quality. Patient without overt signs or symptoms of penetration/aspiration. Patient  passed the screener.    [place] Swallow Protocol dictates patient remain NPO if fail screener; (Angelesr et al. 2014) however, an objective swallow assessment is not available at this time, patient will remain NPO until objective assessment is completed unless otherwise indicated. SLP is recommending MBSS assess swallow effectiveness, ID/rule out penetration and aspiration, make appropriate recommendations regarding safest diet consistency, effective compensatory strategies and safe eating environment.       PILLARS OF PNA: According to Dr. Jon Hutchins (2005), there are 3 pillars that contribute to aspiration related pneumonia. The Three Pillars of Aspiration Pneumonia is research showing that aspiration pneumonia only develops when three factors are present: aspiration, compromised immune system, poor oral hygiene. Pt presents with the following Pillars of Pneumonia (Cuong, 2008):    Risk factors Present (yes/no) Comments:   Impaired health status  yes     Poor oral health   no     High prevalence of dysphagia risk factors  no         PO Trials:   Patient presented with:   ICE CHIPS: Independent  THIN:  self regulated thin liquid via straw  PUREE: Independent  MINCED AND MOIST: Independent  SOFT AND BITE SIZED: Independent  SOLID: Independent        Limitations: utilized  language line    IMPRESSION:   Patient's oropharyngeal swallow appear within functional limits for the  consistencies assessed. No clinical s/sx of airway invasion appreciated during this assessment. Based on this assessment, patient appears safe for initiation of a PO diet with general swallowing precautions. SLP to follow up as indicated.       Goals:     LONG TERM GOAL    Roman Najera will tolerate Regular (IDDSI level 7) consistency diet with Thin (IDDSI Level 0) liquids without overt s/sx of aspiration to maintain appropriate nutrition and hydration.  Initial   Roman Najera will participate in and MBSS to assess swallow effectiveness, ID/rule out penetration and aspiration, make appropriate recommendations regarding safest diet consistency, effective compensatory strategies and safe eating environment. monitor     SHORT TERM GOAL    Roman Najera and/or family will be educated on the recommended aspiration precautions and swallowing strategies.  Initial             The Functional Oral Intake Scale (FOIS) is an ordinal scale that is used to assess the current status and meaningful change in the oral intake. FOIS levels include:        TUBE DEPENDENT   (Levels 1-3) 1. No oral intake    2. Tube dependent with minimal/inconsistent oral intake    3. Tube supplements with consistent oral intake          TOTAL ORAL INTAKE (Levels 4-7) 4. Total oral intake of a single consistency    5. Total oral intake of multiple consistencies requiring special preparation    6. Total oral intake with no special preparation, but must avoid specific foods or liquid items    7. Total oral intake with no restrictions   Patient is currently judged to be at FOIS Level 6     Education:  Aspiration precautions and Recommendations     Learners: Patient, Nurse (Aline), were educated on the results and recommendations of this evaluation. All expressed understanding. Patient will benefit from ongoing education.    Barriers to Learning:      Teaching Method: Verbal, Audio/Visual        *If this is the last documented evaluation/treatment note, then it will signify discharge from acute care prior to discharge from speech services and will serve as the discharge summary.*            Reference:   American Speech-Language-Hearing Association. (n.d.b). Adult dysphagia. (Practice Portal). https://www.[facility].org/practice-portal/clinical-topics/adult-dysphagia/  YOUSIF Miller (2018). Use of modified diets to prevent aspiration in oropharyngeal dysphagia: Is current practice justified?. [employer], 18(1), 1-10.  YOUSIF Rubio., AMILCAR Peña, KESHA Diaz, & ISIDRA Gray (2002). Predictors of aspiration pneumonia in nursing home residents.  Dysphagia, 17(4), 298-307.  AMILCAR Spence (2016). Best practices for dehydration prevention. Perspectives of the [employer] Special Interest Groups - SIG 13, 1(2), 72- 80.    [place] Swallow Protocol Validation Information   1. Three-ounce water swallow test validation first reported on 44 stroke patients by Rio et al. (1992). Failure required referral for objective (VFSS) dysphagia test.   2. A revised 3-ounce water swallow challenge administered to 3,000 hospitalized patients with 14 distinct diagnoses and referenced with FEES as the standard correctly predicted aspiration 96.5% of the time, with a negative predictive value of 97.9%, and a false negative rate of <=2.0%. (ROSEMARY Reynolds. & Checo SJENNIFER. [2008]. Clinical utility of the 3-ounce water swallow test. Dysphagia, 23, 244-250.)   3. Validation study of [place] Swallow Protocol was reported using 25 subjects with categorical diagnoses of esophageal surgery, head & neck cancer, neurosurgery, medical issues, or neurological (CAV, MS, TBI) and using VFSS as the standard reference. Seven participants passed and 18 failed the 3-ounce swallow challenge. Of the 18 who failed, 14 aspirated on VFSS (true positives) and 4 did not aspirate on VFSS (false positives). Sensitivity for the  protocol = 100%, specificity = 64%, positive predictive value = 78%, and negative predictive value = 100%. All participants who passed the protocol, i.e., deemed to have no aspiration risk, also did not aspirate during VFSS. (DAVINA Reynolds., ISATU James, & THEODORA Kessler. [2014). Validation of the [place] Swallow Protocol: A prospective double-blinded videofluoroscopic study. Dysphagia, 29, 199-203.          Rajesh Gonzalez M.S. [facility]-SLP  [facility]

## 2024-12-10 NOTE — ED PROVIDER NOTES
Encounter Date: 12/9/2024       History     Chief Complaint   Patient presents with    Weight Loss    Vomiting    Nausea    Dysphagia     Patient  use  to  weigh 80kgs  and  now 58.15kgs.  was  taking  some  calcium pills he  got  from  [place] to help  with  elbow  pain     Patient is a 34-year-old male past medical history of diabetes.  Patient reports 3 day history of dry mouth, frequent urination, thirst, nausea, vomiting, and subjective fever.  Patient reports pain with swallowing that is improved with drinking water and burning epigastric abdominal pain that feels different from prior history of reflux.  Patient reports 8 month history of unintentional weight loss, reports losing 30 kg.  Patient reports 5 month history of intermittent fatigue.  Patient reports the diabetes uncontrolled, takes no medication and denies routinely checking a.m. glucose.  Patient denies recent history of travel or sick contacts.  Patient denies history of tobacco, alcohol, recreational drug use.    The history is provided by the patient. A  was used.     Review of patient's allergies indicates:  No Known Allergies  History reviewed. No pertinent past medical history.  History reviewed. No pertinent surgical history.  No family history on file.     Review of Systems   Constitutional:  Positive for appetite change, fatigue and fever.   HENT:  Positive for sore throat, trouble swallowing and voice change (Secondary to dry mouth). Negative for drooling and ear pain.    Respiratory:  Negative for shortness of breath.    Cardiovascular:  Negative for chest pain and palpitations.   Gastrointestinal:  Positive for abdominal pain (Epigastric and left lower quadrant), nausea and vomiting. Negative for constipation and diarrhea.   Endocrine: Positive for polydipsia and polyuria.   Genitourinary:  Negative for difficulty urinating and hematuria.   Skin:  Negative for pallor and rash.   Neurological:  Negative for dizziness,  syncope, numbness and headaches.       Physical Exam     Initial Vitals [12/09/24 2056]   BP Pulse Resp Temp SpO2   (!) 135/99 107 20 98.2 °F (36.8 °C) 99 %      MAP       --         Physical Exam    Constitutional: He appears well-developed. He is not diaphoretic. No distress.   HENT:   Head: Normocephalic and atraumatic.   Right Ear: External ear normal.   Left Ear: External ear normal. Mouth/Throat: Oropharynx is clear and moist. No oropharyngeal exudate.   Eyes: Conjunctivae and EOM are normal. No scleral icterus.   Neck: No JVD present.   Normal range of motion.  Cardiovascular:  Regular rhythm.     Exam reveals no gallop and no friction rub.       No murmur heard.  Tachycardic   Pulmonary/Chest: Breath sounds normal. No stridor. No respiratory distress. He has no wheezes. He has no rhonchi. He has no rales. He exhibits no tenderness.   Abdominal: Abdomen is soft. Bowel sounds are normal. He exhibits no distension and no mass. There is abdominal tenderness (Diffuse abdominal tenderness). There is no rebound and no guarding.   Musculoskeletal:         General: No tenderness or edema.      Cervical back: Normal range of motion.     Neurological: He is alert and oriented to person, place, and time. GCS score is 15. GCS eye subscore is 4. GCS verbal subscore is 5. GCS motor subscore is 6.   Skin: Skin is warm and dry. Capillary refill takes less than 2 seconds. No pallor.   Psychiatric: He has a normal mood and affect.         ED Course   Procedures  Labs Reviewed   COMPREHENSIVE METABOLIC PANEL - Abnormal       Result Value    Sodium 131 (*)     Potassium 4.5      Chloride 87 (*)     CO2 20 (*)     Glucose 925 (*)     Blood Urea Nitrogen 20.7 (*)     Creatinine 1.91 (*)     Calcium 10.8 (*)     Protein Total 7.9      Albumin 4.6      Globulin 3.3      Albumin/Globulin Ratio 1.4      Bilirubin Total 0.7       (*)     ALT 27      AST 15      eGFR 46      Anion Gap 24.0      BUN/Creatinine Ratio 11      URINALYSIS, REFLEX TO URINE CULTURE - Abnormal    Color, UA Colorless (*)     Appearance, UA Clear      Specific Gravity, UA 1.032 (*)     pH, UA 5.0      Protein, UA Negative      Glucose, UA 4+ (*)     Ketones, UA 3+ (*)     Blood, UA Negative      Bilirubin, UA Negative      Urobilinogen, UA Normal      Nitrites, UA Negative      Leukocyte Esterase, UA Negative      RBC, UA None Seen      WBC, UA None Seen      Bacteria, UA None Seen      Squamous Epithelial Cells, UA None Seen      Hyaline Casts, UA None Seen     HEMOGLOBIN A1C - Abnormal    Hemoglobin A1c >14.0 (*)     Estimated Average Glucose       BLOOD GAS - Abnormal    Sample Type Arterial Blood      Sample site Left Radial Artery      Drawn by RA      pH, Blood gas 7.370      pCO2, Blood gas 37.0      pO2, Blood gas 91.0      TOC2, Blood gas 21.1 (*)     Base Excess, Blood gas -5.20 (*)     sO2, Blood gas 103.0      HCO3, Blood gas 20.0 (*)     pAO2 103      A-aDO2 12      THb, Blood gas 14.4      O2 Hb, Blood Gas 95.2      CO Hgb 0.3 (*)     Met Hgb 0.5      Allens Test Yes      FIO2, Blood gas 21     BETA - HYDROXYBUTYRATE, SERUM - Abnormal    Beta Hydroxybutyrate 7.00 (*)    POCT GLUCOSE - Abnormal    POCT Glucose >500 (*)    MAGNESIUM - Normal    Magnesium Level 2.40     LIPASE - Normal    Lipase Level 25     COVID/RSV/FLU A&B PCR - Normal    Influenza A PCR Not Detected      Influenza B PCR Not Detected      Respiratory Syncytial Virus PCR Not Detected      SARS-CoV-2 PCR Not Detected      Narrative:     The Xpert Xpress SARS-CoV-2/FLU/RSV plus is a rapid, multiplexed real-time PCR test intended for the simultaneous qualitative detection and differentiation of SARS-CoV-2, Influenza A, Influenza B, and respiratory syncytial virus (RSV) viral RNA in either nasopharyngeal swab or nasal swab specimens.         CBC W/ AUTO DIFFERENTIAL    Narrative:     The following orders were created for panel order CBC Auto Differential.  Procedure                                Abnormality         Status                     ---------                               -----------         ------                     CBC with Differential[[id number]]                           Final result                 Please view results for these tests on the individual orders.   CBC WITH DIFFERENTIAL    WBC 6.17      RBC 5.42      Hgb 16.2      Hct 46.5      MCV 85.8      MCH 29.9      MCHC 34.8      RDW 11.9      Platelet 282      MPV 10.1      Neut % 56.0      Lymph % 33.7      Mono % 8.9      Eos % 0.3      Basophil % 0.8      Lymph # 2.08      Neut # 3.45      Mono # 0.55      Eos # 0.02      Baso # 0.05      IG# 0.02      IG% 0.3      NRBC% 0.0     POCT GLUCOSE MONITORING CONTINUOUS          Imaging Results    None          Medications   sodium chloride 0.9% bolus 1,000 mL 1,000 mL (0 mLs Intravenous Stopped 12/9/24 2232)   sodium chloride 0.9% bolus 1,000 mL 1,000 mL (0 mLs Intravenous Stopped 12/9/24 0593)   insulin regular injection 6 Units 0.06 mL (6 Units Intravenous Given 12/9/24 2228)     Medical Decision Making  Amount and/or Complexity of Data Reviewed  Labs: ordered. Decision-making details documented in ED Course.     Details: CBC-unremarkable   CMP-significant for hyperglycemia 925, sodium 131, potassium 4.5, bicarb 20, gap 24, BUN/creatinine 20.7/1.91, Mag 2.4.  Hemoglobin A1c- greater than 14  COVID/flu/RSV-negative  UA-significant for 4+ glucose, 3+ ketones     Risk  OTC drugs.  Prescription drug management.  Decision regarding hospitalization.      Additional MDM:   Differential Diagnosis:   Other: The following diagnoses were also considered and will be evaluated: HHS, Uncontrolled type 2 diabetes with hyperglycemia and JYOTI.    Hyperglycemia: Initial glucose level was 925. The initial potassium level was 4.5. After the glucose level was received the following occurred: treatment was started. Therapy: IV fluid bolus and subcutaneous insulin.           Attending Attestation:    Physician Attestation Statement for Resident:  As the supervising MD   Physician Attestation Statement: I have personally seen and examined this patient.   I agree with the above history.  -:   As the supervising MD I agree with the above PE.     As the supervising MD I agree with the above treatment, course, plan, and disposition.   I was personally present during the entire procedure.  I have reviewed and agree with the residents interpretation of the following: lab data.                 ED Course as of 12/09/24 2347   Mon Dec 09, 2024   2342 Patient's CBG still > 500 - patient has received 2 L of IV fluids and 6units of regular insulin.  On laboratory evaluation, pH 7.37, but elevated anion gap and elevated Beta Hydroxybut.  Will admit to ICU and place on insulin drip. [MW]      ED Course User Index  [MW] Carlos Bueno MD                           Clinical Impression:  Final diagnoses:  [E11.65] Uncontrolled type 2 diabetes mellitus with hyperglycemia (Primary)  [E11.00] Type 2 diabetes mellitus with hyperosmolar hyperglycemic state (HHS)  [E86.0] Dehydration  [N17.9] JYOTI (acute kidney injury)          ED Disposition Condition    Admit Stable                Jose A Sherman DO  Resident  12/09/24 2917       Carlos Bueno MD  12/11/24 1729

## 2024-12-10 NOTE — H&P
[facility] - Emergency Dept  Pulmonary Critical Care Note    Patient Name: Roman Najera  MRN: [id number]  Admission Date: 12/9/2024  Hospital Length of Stay: 0 days  Code Status: Full Code  Attending Provider: Ismael Corcoran MD  Primary Care Provider: Ayanna, Primary Doctor     Subjective:     HPI:   Roman Najera is a 34-year-old male past medical history type 2 diabetes who presented to the ED with complaints of nausea and vomiting.  Patient is started having nausea nonbloody vomiting 3 days ago with loss of appetite and cramping epigastric pain radiating to left lower quadrant with subjective fevers.  Denied any diarrhea, constipation, or current bloody stools, though he recently has been having black stool for 3 months that has resolved.  He has also been complaining of difficulty swallowing and food getting stuck that passes with water followed by burning chest pain.  He has lost around 30 kg over the last 8 months, during the same time he has been complaining of fatigue.  Patient is supposed to be on metformin and Lantus, he stated that he is not taking any medication.  He denied any smoking, alcohol, or drug abuse. No family history of cancers  In the ED the patient was afebrile, slightly hypertensive.  Lab for significant for glucose of 925, high anion gap metabolic acidosis with CO2 20 and anion gap 24, hyponatremia 131, BUN/creatinine 20.7/1.91, baseline 11/1.31, with hydroxybutyrate 7, A1c > 14, ABG unremarkable.    Hospital Course/Significant events:  12/10/2024 patient has been admitted to the ICU for insulin drip due to HHS    24 Hour Interval History:      History reviewed. No pertinent past medical history.    History reviewed. No pertinent surgical history.    Social History     Socioeconomic History    Marital status: Significant Other           Objective:     Current Outpatient Medications   Medication Instructions    clotrimazole (LOTRIMIN) 1 % cream Topical (Top), 2 times daily,  "Apply to affected area 2 times daily    LANTUS SOLOSTAR U-100 INSULIN 8 Units, Subcutaneous, Nightly    metFORMIN (GLUCOPHAGE) 500 mg, Oral, 2 times daily with meals    pen needle, diabetic (BD ULTRA-FINE MICRO PEN NEEDLE) 32 gauge x 1/4" Ndle 1 each, Misc.(Non-Drug; Combo Route), Nightly       Current Inpatient Medications        No intake or output data in the 24 hours ending 12/10/24 0015    ROS     Vital Signs (Most Recent):  Temp: 98.2 °F (36.8 °C) (12/09/24 2056)  Pulse: 97 (12/09/24 2325)  Resp: 16 (12/09/24 2325)  BP: (!) 133/94 (12/09/24 2325)  SpO2: 99 % (12/09/24 2325)  Body mass index is 20.08 kg/m².  Weight: 58.2 kg (128 lb 3.2 oz) Vital Signs (24h Range):  Temp:  [98.2 °F (36.8 °C)] 98.2 °F (36.8 °C)  Pulse:  [] 97  Resp:  [16-20] 16  SpO2:  [99 %-100 %] 99 %  BP: (133-141)/() 133/94     Physical Exam      Mechanical ventilation support:       Lines/Drains/Airways       Peripheral Intravenous Line  Duration                  Peripheral IV - Single Lumen 12/09/24 2120 20 G 1 1/4 in Left Antecubital <1 day                    Significant Labs:    Lab Results   Component Value Date    WBC 6.17 12/09/2024    HGB 16.2 12/09/2024    HCT 46.5 12/09/2024    MCV 85.8 12/09/2024     12/09/2024         BMP  Lab Results   Component Value Date     (L) 12/09/2024    K 4.5 12/09/2024    CL 87 (L) 12/09/2024    CO2 20 (L) 12/09/2024    BUN 20.7 (H) 12/09/2024    CREATININE 1.91 (H) 12/09/2024    CALCIUM 10.8 (H) 12/09/2024       ABG  Recent Labs   Lab 12/09/24 2313   PH 7.370   PO2 91.0   PCO2 37.0   HCO3 20.0*           Significant Imaging:  I have reviewed all pertinent imaging within the past 24 hours.        Assessment/Plan:     Assessment  HHS; uncontrolled type 2 diabetes  Nausea and vomiting with dehydration  JYOTI secondary to above  Heartburn, dysphagia, weight loss, and history of melena concerning for malignancy  Elevated BHB might be due to starvation ketosis      Plan  - [facility] Pathway " initiated, leaning away from a diagnosis of DKA due to PH>7.3, BHB can be explained with starvation ketosis  - Blood sugar on arrival 925 with a bicarb 20, anion gap 24, BHB 7 and pH 7.37  - HbA1c >14  - Likely induced by medication non-compliance and starvation  - Home DM regimen:  metformin 500 bid and lantus 8 qhs  - Start insulin gtt per protocol with q1h accuchecks while on the drip.    - Once Bicarb >18, Anion gap <10, Glucose <200 on 2 readings and able to tolerate PO intake without nausea and vomiting, transition to subq insulin with a 1-2 h overlap with drip.    Long acting insulin dose:  Lantus (0.25mg/kg) daily or in 2 doses  Short acting insulin dose:  Aspart (0.08mg/kg) units per meal  - Start normal saline at 125 cc/hr.  Once blood sugar is 250, change IVF to D5 1/2 NS at 50cc/h  - Monitor electrolytes with BMP q4h while on insulin gtt and place on tele  - Consider GI consult in the morning for dysphagia and red flags for malignancy, currently H&H is normal  - diet NPO  - Zofran prn.  - Ordered H. Pylori stool antigen.      DVT Prophylaxis: SCD  GI Prophylaxis: Gregor Nix MD  Pulmonary Critical Care Medicine  [facility] - Emergency Dept

## 2024-12-10 NOTE — PROGRESS NOTES
Inpatient Nutrition Assessment    Admit Date: 12/9/2024   Total duration of encounter: 1 day   Patient Age: 34 y.o.    Nutrition Recommendation/Prescription     Pt passed swallow evaluation with ST--diet progressed to 2000 Diabetic diet  Will order chocolate boost glucose control tid; Boost Glucose Control (provides 190 kcal, 16 g protein per serving)   MVI/fe  Biweekly wt  5. Pt education on diet/complete;  used #[id number]  Will monitor nutrition status     Communication of Recommendations: reviewed with nurse and reviewed with patient    Nutrition Assessment     Malnutrition Assessment/Nutrition-Focused Physical Exam    Malnutrition Context: chronic illness (12/10/24 1340)  Malnutrition Level: moderate (12/10/24 1340)  Energy Intake (Malnutrition): less than or equal to 75% for greater than or equal to 1 month (12/10/24 1340)  Weight Loss (Malnutrition): 20% in 1 year (12/10/24 1340)     Orbital Region (Subcutaneous Fat Loss): well nourished        Muscle Mass (Malnutrition): mild depletion (12/10/24 1340)     Clavicle Bone Region (Muscle Loss): mild depletion                    Fluid Accumulation (Malnutrition): other (see comments) (Not present) (12/10/24 1340)     Hand  Strength, Right (Malnutrition): Unable to assess (12/10/24 1340)  A minimum of two characteristics is recommended for diagnosis of either severe or non-severe malnutrition.    Chart Review    Reason Seen: continuous nutrition monitoring    Malnutrition Screening Tool Results   Have you recently lost weight without trying?: No  Have you been eating poorly because of a decreased appetite?: No   MST Score: 0   Diagnosis:  Uncontrolled DM, N/V, dehydration, JYOTI, heartburn, dysphagia, wt loss, melena--concern malignancy, elevated BHB    Relevant Medical History: DM    Scheduled Medications:  insulin aspart U-100, 8 Units, TIDWM  insulin glargine U-100, 26 Units, BID  mupirocin, , BID    Continuous Infusions:  insulin regular 1  units/mL infusion orderable (DKA), Last Rate: 0.1 Units/kg/hr (12/10/24 1246)    PRN Medications:  dextrose 10%, 12.5 g, PRN  dextrose 10%, 25 g, PRN  glucagon (human recombinant), 1 mg, PRN  glucose, 16 g, PRN  glucose, 24 g, PRN  insulin aspart U-100, 0-10 Units, QID (AC + HS) PRN  ondansetron, 4 mg, Q6H PRN  potassium chloride, 40 mEq, PRN   And  potassium chloride, 60 mEq, PRN   And  potassium chloride, 80 mEq, PRN  sodium chloride 0.9%, 10 mL, PRN    Calorie Containing IV Medications: no significant kcals from medications at this time    Recent Labs   Lab 12/09/24  2131 12/10/24  0015 12/10/24  0350 12/10/24  0546 12/10/24  0750 12/10/24  1143   *  --   --  145 146* 142   K 4.5  --   --  2.9* 2.8* 3.2*   CALCIUM 10.8*  --   --  9.2 9.3 9.2   PHOS  --  5.4* 3.1  --  3.2 3.3   MG 2.40  --   --   --   --   --    CL 87*  --   --  107 108* 107   CO2 20*  --   --  28 28 31*   BUN 20.7*  --   --  13.8 13.5 13.1   CREATININE 1.91*  --   --  1.02 0.96 0.86   EGFRNORACEVR 46  --   --  >60 >60 >60   GLUCOSE 925*  --   --  220* 155* 179*   BILITOT 0.7  --   --   --   --   --    ALKPHOS 187*  --   --   --   --   --    ALT 27  --   --   --   --   --    AST 15  --   --   --   --   --    ALBUMIN 4.6  --   --   --   --   --    HGBA1C >14.0*  --   --   --   --   --    LIPASE 25  --   --   --   --   --    WBC 6.17  --  6.92  --   --   --    HGB 16.2  --  14.4  --   --   --    HCT 46.5  --  39.1*  --   --   --      Nutrition Orders:  Diet diabetic Cardiac (Low Na/Chol); 2000 Calories (up to 75 gm per meal)      Appetite/Oral Intake: fair/50-75% of meals  Factors Affecting Nutritional Intake: decreased appetite, difficulty/impaired swallowing, dry mouth, nausea, and vomiting  Social Needs Impacting Access to Food: none identified  Food/Judaism/Cultural Preferences: none reported  Food Allergies: none reported  Last Bowel Movement: 12/08/24  Wound(s):  none    Comments    (12/10) Used  # [id number] . Pt  "reported + N/V x 3 days; decreased appetite during this time. Pt reported difficulty swallowing /no saliva; pt passed swallow study with ST today. Pt reported po intake --up/down past couple months; + wt loss--unintentional; approx 20% decrease over 10 months; + muslce wasting. Will order ONS for added nutrition.     Anthropometrics    Height: 5' 7" (170.2 cm), Height Method: Measured  Last Weight: 58.2 kg (128 lb 4.9 oz) (12/10/24 0136), Weight Method: Standard Scale  BMI (Calculated): 20.1  BMI Classification: normal (BMI 18.5-24.9)        Ideal Body Weight (IBW), Male: 148 lb     % Ideal Body Weight, Male (lb): 86.7 %                 Usual Body Weight (UBW), k.5 kg  % Usual Body Weight: 80.44     Usual Weight Provided By: patient and EMR weight history    Wt Readings from Last 5 Encounters:   12/10/24 58.2 kg (128 lb 4.9 oz)   04/10/24 70 kg (154 lb 5.2 oz)   24 72.6 kg (160 lb)     Weight Change(s) Since Admission: #   Wt Readings from Last 1 Encounters:   12/10/24 0136 58.2 kg (128 lb 4.9 oz)   24 58.2 kg (128 lb 3.2 oz)   Admit Weight: 58.2 kg (128 lb 3.2 oz) (24), Weight Method: Standard Scale    Estimated Needs    Weight Used For Calorie Calculations: 58.2 kg (128 lb 4.9 oz)  Energy Calorie Requirements (kcal): 2037 calories/d; 35 neeur/kg /wt gain  Energy Need Method: Kcal/kg  Weight Used For Protein Calculations: 58.2 kg (128 lb 4.9 oz)  Protein Requirements: 76 gm protein/d; 1.3 gm/kg  Fluid Requirements (mL): 2037 ml/d; 1ml/neeru  CHO Requirement: 229 gm CHO/d     Enteral Nutrition     Patient not receiving enteral nutrition at this time.    Parenteral Nutrition     Patient not receiving parenteral nutrition support at this time.    Evaluation of Received Nutrient Intake    Calories: not meeting estimated needs  Protein: not meeting estimated needs    Patient Education     Education Provided: diabetic diet  Teaching Method: explanation and printed " materials  Comprehension: verbalizes understanding  Barriers to Learning: none evident  Expected Compliance: fair  Comments: All questions were answered and dietitian's contact information was provided.     Nutrition Diagnosis     PES: Inadequate oral intake related to chronic illness as evidenced by eating 75% or less > month . (new)     PES: Moderate chronic disease or condition related malnutrition Related to chronic disease  As Evidenced by:  - weight loss: 20% in 1 year - energy intake: <= 75% for 1 months (meets criteria for <= 75% >= 1 month (severe - chronic)) - muscle mass depletion: 1 area of mild muscle loss (Clavicle) new    Nutrition Interventions     Intervention(s): modified composition of meals/snacks, commercial beverage, multivitamin/mineral supplement therapy, purpose of nutrition education, and collaboration with other providers  Intervention(s): Nutrition education;Care coordination or referral;Oral diet/nutrient modifications;Oral nutritional supplement    Goal: Meet greater than 80% of nutritional needs by follow-up. (new)  Goal: Maintain weight throughout hospitalization. (new)    Nutrition Goals & Monitoring     Dietitian will monitor: food and beverage intake, weight, food/nutrition knowledge skill, and glucose/endocrine profile  Discharge planning: continue diabetic  diet with boost glucose control  oral supplements  Nutrition Risk/Follow-Up: moderate (follow-up in 3-5 days)   Please consult if re-assessment needed sooner.

## 2024-12-11 LAB
ALBUMIN SERPL-MCNC: 3.1 G/DL (ref 3.5–5)
ALBUMIN/GLOB SERPL: 1.5 RATIO (ref 1.1–2)
ALP SERPL-CCNC: 51 UNIT/L (ref 40–150)
ALT SERPL-CCNC: 21 UNIT/L (ref 0–55)
ANION GAP SERPL CALC-SCNC: 8 MEQ/L
AST SERPL-CCNC: 18 UNIT/L (ref 5–34)
BASOPHILS # BLD AUTO: 0.03 X10(3)/MCL
BASOPHILS NFR BLD AUTO: 0.5 %
BILIRUB SERPL-MCNC: 0.4 MG/DL
BUN SERPL-MCNC: 13.5 MG/DL (ref 8.9–20.6)
CALCIUM SERPL-MCNC: 8.8 MG/DL (ref 8.4–10.2)
CHLORIDE SERPL-SCNC: 106 MMOL/L (ref 98–107)
CO2 SERPL-SCNC: 29 MMOL/L (ref 22–29)
CREAT SERPL-MCNC: 0.82 MG/DL (ref 0.72–1.25)
CREAT/UREA NIT SERPL: 16
EOSINOPHIL # BLD AUTO: 0.07 X10(3)/MCL (ref 0–0.9)
EOSINOPHIL NFR BLD AUTO: 1.3 %
ERYTHROCYTE [DISTWIDTH] IN BLOOD BY AUTOMATED COUNT: 12 % (ref 11.5–17)
GFR SERPLBLD CREATININE-BSD FMLA CKD-EPI: >60 ML/MIN/1.73/M2
GLOBULIN SER-MCNC: 2.1 GM/DL (ref 2.4–3.5)
GLUCOSE SERPL-MCNC: 204 MG/DL (ref 74–100)
H. PYLORI STOOL: NEGATIVE
HCT VFR BLD AUTO: 38.5 % (ref 42–52)
HGB BLD-MCNC: 13.1 G/DL (ref 14–18)
HOLD SPECIMEN: NORMAL
HOLD SPECIMEN: NORMAL
IMM GRANULOCYTES # BLD AUTO: 0.01 X10(3)/MCL (ref 0–0.04)
IMM GRANULOCYTES NFR BLD AUTO: 0.2 %
LYMPHOCYTES # BLD AUTO: 2.68 X10(3)/MCL (ref 0.6–4.6)
LYMPHOCYTES NFR BLD AUTO: 48.9 %
MCH RBC QN AUTO: 29.4 PG (ref 27–31)
MCHC RBC AUTO-ENTMCNC: 34 G/DL (ref 33–36)
MCV RBC AUTO: 86.5 FL (ref 80–94)
MONOCYTES # BLD AUTO: 0.32 X10(3)/MCL (ref 0.1–1.3)
MONOCYTES NFR BLD AUTO: 5.8 %
NEUTROPHILS # BLD AUTO: 2.37 X10(3)/MCL (ref 2.1–9.2)
NEUTROPHILS NFR BLD AUTO: 43.3 %
NRBC BLD AUTO-RTO: 0 %
PLATELET # BLD AUTO: 189 X10(3)/MCL (ref 130–400)
PMV BLD AUTO: 9.8 FL (ref 7.4–10.4)
POCT GLUCOSE: 122 MG/DL (ref 70–110)
POCT GLUCOSE: 180 MG/DL (ref 70–110)
POCT GLUCOSE: 187 MG/DL (ref 70–110)
POCT GLUCOSE: 191 MG/DL (ref 70–110)
POCT GLUCOSE: 390 MG/DL (ref 70–110)
POCT GLUCOSE: >500 MG/DL (ref 70–110)
POCT GLUCOSE: >500 MG/DL (ref 70–110)
POTASSIUM SERPL-SCNC: 3.1 MMOL/L (ref 3.5–5.1)
PROT SERPL-MCNC: 5.2 GM/DL (ref 6.4–8.3)
RBC # BLD AUTO: 4.45 X10(6)/MCL (ref 4.7–6.1)
SODIUM SERPL-SCNC: 143 MMOL/L (ref 136–145)
WBC # BLD AUTO: 5.48 X10(3)/MCL (ref 4.5–11.5)

## 2024-12-11 PROCEDURE — 94761 N-INVAS EAR/PLS OXIMETRY MLT: CPT

## 2024-12-11 PROCEDURE — 97535 SELF CARE MNGMENT TRAINING: CPT

## 2024-12-11 PROCEDURE — 63600175 PHARM REV CODE 636 W HCPCS

## 2024-12-11 PROCEDURE — 36415 COLL VENOUS BLD VENIPUNCTURE: CPT

## 2024-12-11 PROCEDURE — 87338 HPYLORI STOOL AG IA: CPT

## 2024-12-11 PROCEDURE — 11000001 HC ACUTE MED/SURG PRIVATE ROOM

## 2024-12-11 PROCEDURE — 25000003 PHARM REV CODE 250: Performed by: INTERNAL MEDICINE

## 2024-12-11 PROCEDURE — 80053 COMPREHEN METABOLIC PANEL: CPT

## 2024-12-11 PROCEDURE — 85025 COMPLETE CBC W/AUTO DIFF WBC: CPT

## 2024-12-11 RX ORDER — INSULIN ASPART 100 [IU]/ML
15 INJECTION, SOLUTION INTRAVENOUS; SUBCUTANEOUS
Status: DISCONTINUED | OUTPATIENT
Start: 2024-12-11 | End: 2024-12-12 | Stop reason: HOSPADM

## 2024-12-11 RX ADMIN — INSULIN ASPART 10 UNITS: 100 INJECTION, SOLUTION INTRAVENOUS; SUBCUTANEOUS at 12:12

## 2024-12-11 RX ADMIN — INSULIN ASPART 8 UNITS: 100 INJECTION, SOLUTION INTRAVENOUS; SUBCUTANEOUS at 12:12

## 2024-12-11 RX ADMIN — MUPIROCIN: 20 OINTMENT TOPICAL at 09:12

## 2024-12-11 RX ADMIN — INSULIN ASPART 2 UNITS: 100 INJECTION, SOLUTION INTRAVENOUS; SUBCUTANEOUS at 08:12

## 2024-12-11 RX ADMIN — INSULIN ASPART 2 UNITS: 100 INJECTION, SOLUTION INTRAVENOUS; SUBCUTANEOUS at 05:12

## 2024-12-11 RX ADMIN — INSULIN ASPART 8 UNITS: 100 INJECTION, SOLUTION INTRAVENOUS; SUBCUTANEOUS at 08:12

## 2024-12-11 RX ADMIN — MUPIROCIN: 20 OINTMENT TOPICAL at 08:12

## 2024-12-11 RX ADMIN — INSULIN GLARGINE 26 UNITS: 100 INJECTION, SOLUTION SUBCUTANEOUS at 09:12

## 2024-12-11 RX ADMIN — INSULIN GLARGINE 26 UNITS: 100 INJECTION, SOLUTION SUBCUTANEOUS at 08:12

## 2024-12-11 RX ADMIN — INSULIN ASPART 15 UNITS: 100 INJECTION, SOLUTION INTRAVENOUS; SUBCUTANEOUS at 05:12

## 2024-12-11 NOTE — PLAN OF CARE
Problem: Adult Inpatient Plan of Care  Goal: Plan of Care Review  Outcome: Progressing  Goal: Patient-Specific Goal (Individualized)  Outcome: Progressing  Goal: Absence of Hospital-Acquired Illness or Injury  Outcome: Progressing  Goal: Optimal Comfort and Wellbeing  Outcome: Progressing  Goal: Readiness for Transition of Care  Outcome: Progressing     Problem: Diabetes Comorbidity  Goal: Blood Glucose Level Within Targeted Range  Outcome: Progressing     Problem: Adult Inpatient Plan of Care  Goal: Plan of Care Review  Outcome: Progressing  Goal: Patient-Specific Goal (Individualized)  Outcome: Progressing  Goal: Absence of Hospital-Acquired Illness or Injury  Outcome: Progressing  Goal: Optimal Comfort and Wellbeing  Outcome: Progressing  Goal: Readiness for Transition of Care  Outcome: Progressing     Problem: Diabetes Comorbidity  Goal: Blood Glucose Level Within Targeted Range  Outcome: Progressing

## 2024-12-11 NOTE — PLAN OF CARE
Problem: Adult Inpatient Plan of Care  Goal: Plan of Care Review  12/11/2024 1753 by Brittany Valenzuela LPN  Outcome: Progressing  12/11/2024 0919 by Brittany Valenzuela LPN  Outcome: Progressing  Goal: Patient-Specific Goal (Individualized)  12/11/2024 1753 by Brittany Valenzuela LPN  Outcome: Progressing  12/11/2024 0919 by Brittany Valenzuela LPN  Outcome: Progressing  Goal: Absence of Hospital-Acquired Illness or Injury  12/11/2024 1753 by Brittany Valenzuela LPN  Outcome: Progressing  12/11/2024 0919 by Brittany Valenzuela LPN  Outcome: Progressing  Goal: Optimal Comfort and Wellbeing  12/11/2024 1753 by Brittany Valenzuela LPN  Outcome: Progressing  12/11/2024 0919 by Brittany Valenzuela LPN  Outcome: Progressing  Goal: Readiness for Transition of Care  12/11/2024 1753 by Brittany Valenzuela LPN  Outcome: Progressing  12/11/2024 0919 by Brittany Valenzuela LPN  Outcome: Progressing     Problem: Diabetes Comorbidity  Goal: Blood Glucose Level Within Targeted Range  12/11/2024 1753 by Brittany Valenzuela LPN  Outcome: Progressing  12/11/2024 0919 by Brittany Valenzuela LPN  Outcome: Progressing     Problem: Adult Inpatient Plan of Care  Goal: Plan of Care Review  12/11/2024 1753 by Brittany Valenzuela LPN  Outcome: Progressing  12/11/2024 0919 by Brittany Valenzuela LPN  Outcome: Progressing  Goal: Patient-Specific Goal (Individualized)  12/11/2024 1753 by Brittany Valenzuela LPN  Outcome: Progressing  12/11/2024 0919 by Brittany Valenzuela LPN  Outcome: Progressing  Goal: Absence of Hospital-Acquired Illness or Injury  12/11/2024 1753 by Brittany Valenzuela LPN  Outcome: Progressing  12/11/2024 0919 by Brittany Valenzuela LPN  Outcome: Progressing  Goal: Optimal Comfort and Wellbeing  12/11/2024 1753 by Brittany Valenzuela LPN  Outcome: Progressing  12/11/2024 0919 by Brittany Valenzulea LPN  Outcome: Progressing  Goal: Readiness for Transition of Care  12/11/2024 1753 by Brittany Valenzuela LPN  Outcome: Progressing  12/11/2024 0919 by Nicolas  JAIDEN Soto  Outcome: Progressing     Problem: Diabetes Comorbidity  Goal: Blood Glucose Level Within Targeted Range  12/11/2024 1753 by Brittany Valenzuela LPN  Outcome: Progressing  12/11/2024 0919 by Brittany Valenzuela LPN  Outcome: Progressing

## 2024-12-11 NOTE — PROGRESS NOTES
[facility]   Progress Note     Resident Team: [facility] Medicine List 3  Attending Physician: Radames Linares MD  Resident: Juan Kennedy MD   Date of Admit: 12/9/2024    Subjective:      Brief HPI:  Roman Najera is a 34-year-old male past medical history type 2 diabetes who presented to the ED with complaints of nausea and vomiting.  Patient is started having nausea nonbloody vomiting 3 days ago with loss of appetite and cramping epigastric pain radiating to left lower quadrant with subjective fevers.  Denied any diarrhea, constipation, or current bloody stools, though he recently has been having black stool for 3 months that has resolved.  He has also been complaining of difficulty swallowing and food getting stuck that passes with water followed by burning chest pain.  He has lost around 30 kg over the last 8 months, during the same time he has been complaining of fatigue.  Patient is supposed to be on metformin and Lantus, he stated that he is not taking any medication.  He denied any smoking, alcohol, or drug abuse. No family history of cancers  In the ED the patient was afebrile, slightly hypertensive.  Lab for significant for glucose of 925, high anion gap metabolic acidosis with CO2 20 and anion gap 24, hyponatremia 131, BUN/creatinine 20.7/1.91, baseline 11/1.31, with hydroxybutyrate 7, A1c > 14, ABG unremarkable.    Interval History:   NAEON, VSS. Patient has no acute complaints. CBC stable. His am POCT was around 180 - significant improved, however his repeat check around 11 am was elevated again at 390. He received sliding scale of 10 units and later additional scheduled aspart of 8 units.     Review of Systems:  Review of Systems   Constitutional:  Negative for chills and fever.   Respiratory:  Negative for cough, shortness of breath and wheezing.    Cardiovascular:  Negative for chest pain and palpitations.   Gastrointestinal:  Negative for abdominal pain, constipation, diarrhea, nausea  and vomiting.   Genitourinary:  Negative for dysuria.   Neurological:  Negative for weakness and headaches.          Objective:     Vital Signs (Most Recent):  Temp: 97.3 °F (36.3 °C) (12/11/24 1108)  Pulse: 77 (12/11/24 1108)  Resp: 18 (12/11/24 1108)  BP: 112/65 (12/11/24 1108)  SpO2: 98 % (12/11/24 1108) Vital Signs (24h Range):  Temp:  [97.3 °F (36.3 °C)-98.1 °F (36.7 °C)] 97.3 °F (36.3 °C)  Pulse:  [58-83] 77  Resp:  [12-20] 18  SpO2:  [96 %-100 %] 98 %  BP: ()/(65-76) 112/65       Physical Examination:  Physical Exam  Vitals reviewed.   Constitutional:       Appearance: Normal appearance.   HENT:      Head: Normocephalic and atraumatic.   Eyes:      Pupils: Pupils are equal, round, and reactive to light.   Cardiovascular:      Rate and Rhythm: Normal rate.      Pulses: Normal pulses.      Heart sounds: No murmur heard.     No friction rub. No gallop.   Pulmonary:      Effort: No respiratory distress.      Breath sounds: Normal breath sounds. No wheezing or rales.   Abdominal:      General: Bowel sounds are normal. There is no distension.      Palpations: Abdomen is soft.      Tenderness: There is no abdominal tenderness.   Musculoskeletal:      Right lower leg: No edema.      Left lower leg: No edema.   Skin:     General: Skin is warm.      Capillary Refill: Capillary refill takes less than 2 seconds.   Neurological:      Mental Status: He is alert and oriented to person, place, and time.   Psychiatric:         Mood and Affect: Mood normal.         Behavior: Behavior normal.         Laboratory:  Lab Results   Component Value Date     12/10/2024    K 4.1 12/10/2024     12/10/2024    CO2 27 12/10/2024    BUN 13.1 12/10/2024    CREATININE 1.06 12/10/2024    CALCIUM 9.1 12/10/2024    ALKPHOS 187 (H) 12/09/2024    AST 15 12/09/2024    ALT 27 12/09/2024    MG 2.40 12/09/2024    PHOS 3.1 12/10/2024        Lab Results   Component Value Date    WBC 5.48 12/11/2024    RBC 4.45 (L) 12/11/2024    HGB  13.1 (L) 12/11/2024    HCT 38.5 (L) 12/11/2024    MCV 86.5 12/11/2024    MCH 29.4 12/11/2024    MCHC 34.0 12/11/2024    RDW 12.0 12/11/2024     12/11/2024    MPV 9.8 12/11/2024        Microbiology:   Microbiology Results (last 7 days)       ** No results found for the last 168 hours. **            Other Results:      Radiology:  Imaging Results    None         Current Medications:     Infusions:       Scheduled:   insulin aspart U-100  15 Units Subcutaneous TIDWM    insulin glargine U-100  26 Units Subcutaneous BID    mupirocin   Nasal BID        PRN:    Current Facility-Administered Medications:     dextrose 10%, 12.5 g, Intravenous, PRN    dextrose 10%, 25 g, Intravenous, PRN    glucagon (human recombinant), 1 mg, Intramuscular, PRN    glucose, 16 g, Oral, PRN    glucose, 24 g, Oral, PRN    insulin aspart U-100, 0-10 Units, Subcutaneous, QID (AC + HS) PRN    ondansetron, 4 mg, Intravenous, Q6H PRN    potassium chloride, 40 mEq, Intravenous, PRN **AND** potassium chloride, 60 mEq, Intravenous, PRN **AND** potassium chloride, 80 mEq, Intravenous, PRN    sodium chloride 0.9%, 10 mL, Intravenous, PRN    Antibiotics:  none      Intake/Output Summary (Last 24 hours) at 12/11/2024 1353  Last data filed at 12/11/2024 1318  Gross per 24 hour   Intake 2832.03 ml   Output 600 ml   Net 2232.03 ml       Lines/Drains/Airways       Peripheral Intravenous Line  Duration                  Peripheral IV - Single Lumen 12/09/24 2120 20 G 1 1/4 in Left Antecubital 1 day         Peripheral IV - Single Lumen 12/10/24 0030 18 G Anterior;Proximal;Right Forearm 1 day                      Assessment & Plan:     Type 2 diabetes mellitus  DKA vs HHS  - Upon review patient has mixed metabolic acidosis with metabolic alkalosis given delta delta gap of > 2 so likely patient was in DKA secondary to medication non adherence  - blood glucose was improving but elevated after meal to 390.   - hold home metformin  - continue with Lantus 26  units BID and increase aspart to 15 units TIDWM for now  - MDSSI  - patient will need close follow up, establish PCP and diabetes education program upon discharge.      CODE STATUS: Full  Access: PIV  Antibiotics: none  Diet: diabetic  DVT Prophylaxis: SCDs   GI Prophylaxis: none  Fluids: none      Disposition: 34 year old male downgraded from ICU after DKA/HHS management. Discharge pending blood glucose control.    Juan Kennedy MD  Internal Medicine - PGY-2        This note was generated via Dictaphone and may contain some voice recognition errors.

## 2024-12-11 NOTE — PT/OT/SLP PROGRESS
"[facility]  Speech Language Pathology -   Swallowing Follow-up Note/Education  Discharge          Chart reviewed.?Patient was previously seen for clinical swallowing evaluation and recommendations were for IDDSI 7/0.    Patient was seen for swallowing reassessment, training on swallowing strategies, and education/training for maximizing safety and diet tolerance.      Patient was seen alert, cooperative, and seated up in bed. Patient was assessed with the following consistencies: thin, regular. The patient was able to self feed independently.      Pain:   0/10  Pain Location / Description: no pain reported  Language line: [id number]      Recommendations:      Continue IDDSI 7/0.   Medication presentation: Whole with liquid wash  Oral Care: Minimum of regular toothbrush use 2-3 times/day    Please offer tray set up with intermittent supervision   Adhere to these aspiration precautions and swallowing strategies (feed only when alert, upright 90 degrees, small bites/sips, slow rate, alternate solids/liquids, remain upright 30 minutes after meals)   Will sign off at this time. Please feel free to reconsult should new swallowing difficulties arise. Thank you   Discharge disposition: No therapy indicated.        HISTORY & PHYSICAL  Active Ambulatory Problems     Diagnosis Date Noted    No Active Ambulatory Problems     Resolved Ambulatory Problems     Diagnosis Date Noted    No Resolved Ambulatory Problems     No Additional Past Medical History           Per medical record: "Roman Najera is a 34-year-old male past medical history type 2 diabetes who presented to the ED with complaints of nausea and vomiting.  Patient is started having nausea nonbloody vomiting 3 days ago with loss of appetite and cramping epigastric pain radiating to left lower quadrant with subjective fevers.  Denied any diarrhea, constipation, or current bloody stools, though he recently has been having black stool for 3 months " "that has resolved.  He has also been complaining of difficulty swallowing and food getting stuck that passes with water followed by burning chest pain"        The results are as follows:?      Oral Phase: WFL.      Pharyngeal Phase: No overt s/sx of airway invasion appreciated during this session.              Impression:   Oropharyngeal swallow appears WFL. Pt tolerating current PO diet. No further skilled ST services indicated at this time.       GOALS:     Long Term Goals:     Consume least restrictive diet safely    Provide individualized education to the patient and family regarding disorder and management     Short Term Goals:    Consume the current diet with no clinical s/s of aspiration using facilitative swallowing strategies independently. Goal met 12/11  Patient and family will be educated on the recommended aspiration precautions and swallowing strategies. Goal met 12/11             *If this is the last documented treatment note, then it will signify discharge from acute care prior to discharge from speech services and will serve as the discharge summary.*          Education: Patient, RN (Patito), were educated on the results and recommendations of this evaluation. All expressed understanding.             Rajesh Gonzalez M.S. CCC-SLP  [facility]           "

## 2024-12-12 VITALS
SYSTOLIC BLOOD PRESSURE: 106 MMHG | BODY MASS INDEX: 20.14 KG/M2 | OXYGEN SATURATION: 99 % | HEIGHT: 67 IN | WEIGHT: 128.31 LBS | TEMPERATURE: 98 F | RESPIRATION RATE: 17 BRPM | HEART RATE: 71 BPM | DIASTOLIC BLOOD PRESSURE: 67 MMHG

## 2024-12-12 PROBLEM — E11.9 TYPE 2 DIABETES MELLITUS WITHOUT COMPLICATION, WITHOUT LONG-TERM CURRENT USE OF INSULIN: Status: RESOLVED | Noted: 2024-12-10 | Resolved: 2024-12-12

## 2024-12-12 LAB
ALBUMIN SERPL-MCNC: 3 G/DL (ref 3.5–5)
ALBUMIN/GLOB SERPL: 1.4 RATIO (ref 1.1–2)
ALP SERPL-CCNC: 45 UNIT/L (ref 40–150)
ALT SERPL-CCNC: 19 UNIT/L (ref 0–55)
ANION GAP SERPL CALC-SCNC: 10 MEQ/L
ANION GAP SERPL CALC-SCNC: 8 MEQ/L
AST SERPL-CCNC: 21 UNIT/L (ref 5–34)
BASOPHILS # BLD AUTO: 0.02 X10(3)/MCL
BASOPHILS NFR BLD AUTO: 0.4 %
BILIRUB SERPL-MCNC: 0.3 MG/DL
BUN SERPL-MCNC: 14 MG/DL (ref 8.9–20.6)
BUN SERPL-MCNC: 14.4 MG/DL (ref 8.9–20.6)
CALCIUM SERPL-MCNC: 8.6 MG/DL (ref 8.4–10.2)
CALCIUM SERPL-MCNC: 8.7 MG/DL (ref 8.4–10.2)
CHLORIDE SERPL-SCNC: 103 MMOL/L (ref 98–107)
CHLORIDE SERPL-SCNC: 106 MMOL/L (ref 98–107)
CO2 SERPL-SCNC: 25 MMOL/L (ref 22–29)
CO2 SERPL-SCNC: 28 MMOL/L (ref 22–29)
CREAT SERPL-MCNC: 0.77 MG/DL (ref 0.72–1.25)
CREAT SERPL-MCNC: 0.87 MG/DL (ref 0.72–1.25)
CREAT/UREA NIT SERPL: 17
CREAT/UREA NIT SERPL: 18
EOSINOPHIL # BLD AUTO: 0.08 X10(3)/MCL (ref 0–0.9)
EOSINOPHIL NFR BLD AUTO: 1.5 %
ERYTHROCYTE [DISTWIDTH] IN BLOOD BY AUTOMATED COUNT: 11.9 % (ref 11.5–17)
GFR SERPLBLD CREATININE-BSD FMLA CKD-EPI: >60 ML/MIN/1.73/M2
GFR SERPLBLD CREATININE-BSD FMLA CKD-EPI: >60 ML/MIN/1.73/M2
GLOBULIN SER-MCNC: 2.1 GM/DL (ref 2.4–3.5)
GLUCOSE SERPL-MCNC: 110 MG/DL (ref 74–100)
GLUCOSE SERPL-MCNC: 175 MG/DL (ref 74–100)
HCT VFR BLD AUTO: 37.2 % (ref 42–52)
HGB BLD-MCNC: 12.7 G/DL (ref 14–18)
HOLD SPECIMEN: NORMAL
HOLD SPECIMEN: NORMAL
IMM GRANULOCYTES # BLD AUTO: 0.01 X10(3)/MCL (ref 0–0.04)
IMM GRANULOCYTES NFR BLD AUTO: 0.2 %
LYMPHOCYTES # BLD AUTO: 2.43 X10(3)/MCL (ref 0.6–4.6)
LYMPHOCYTES NFR BLD AUTO: 47 %
MAGNESIUM SERPL-MCNC: 2.2 MG/DL (ref 1.6–2.6)
MCH RBC QN AUTO: 29.5 PG (ref 27–31)
MCHC RBC AUTO-ENTMCNC: 34.1 G/DL (ref 33–36)
MCV RBC AUTO: 86.5 FL (ref 80–94)
MONOCYTES # BLD AUTO: 0.33 X10(3)/MCL (ref 0.1–1.3)
MONOCYTES NFR BLD AUTO: 6.4 %
NEUTROPHILS # BLD AUTO: 2.3 X10(3)/MCL (ref 2.1–9.2)
NEUTROPHILS NFR BLD AUTO: 44.5 %
NRBC BLD AUTO-RTO: 0 %
PHOSPHATE SERPL-MCNC: 4 MG/DL (ref 2.3–4.7)
PLATELET # BLD AUTO: 169 X10(3)/MCL (ref 130–400)
PMV BLD AUTO: 9.9 FL (ref 7.4–10.4)
POCT GLUCOSE: 126 MG/DL (ref 70–110)
POCT GLUCOSE: 128 MG/DL (ref 70–110)
POCT GLUCOSE: 156 MG/DL (ref 70–110)
POTASSIUM SERPL-SCNC: 2.9 MMOL/L (ref 3.5–5.1)
POTASSIUM SERPL-SCNC: 3.6 MMOL/L (ref 3.5–5.1)
PROT SERPL-MCNC: 5.1 GM/DL (ref 6.4–8.3)
RBC # BLD AUTO: 4.3 X10(6)/MCL (ref 4.7–6.1)
SODIUM SERPL-SCNC: 138 MMOL/L (ref 136–145)
SODIUM SERPL-SCNC: 142 MMOL/L (ref 136–145)
WBC # BLD AUTO: 5.17 X10(3)/MCL (ref 4.5–11.5)

## 2024-12-12 PROCEDURE — 83735 ASSAY OF MAGNESIUM: CPT

## 2024-12-12 PROCEDURE — 63600175 PHARM REV CODE 636 W HCPCS

## 2024-12-12 PROCEDURE — 84100 ASSAY OF PHOSPHORUS: CPT

## 2024-12-12 PROCEDURE — 80053 COMPREHEN METABOLIC PANEL: CPT

## 2024-12-12 PROCEDURE — 36415 COLL VENOUS BLD VENIPUNCTURE: CPT

## 2024-12-12 PROCEDURE — 25000003 PHARM REV CODE 250: Performed by: INTERNAL MEDICINE

## 2024-12-12 PROCEDURE — 85025 COMPLETE CBC W/AUTO DIFF WBC: CPT

## 2024-12-12 PROCEDURE — 25000003 PHARM REV CODE 250

## 2024-12-12 PROCEDURE — 94761 N-INVAS EAR/PLS OXIMETRY MLT: CPT

## 2024-12-12 RX ORDER — LANCETS
EACH MISCELLANEOUS
Qty: 200 EACH | Refills: 5 | Status: SHIPPED | OUTPATIENT
Start: 2024-12-12 | End: 2024-12-12

## 2024-12-12 RX ORDER — POTASSIUM CHLORIDE 20 MEQ/1
40 TABLET, EXTENDED RELEASE ORAL ONCE
Status: COMPLETED | OUTPATIENT
Start: 2024-12-12 | End: 2024-12-12

## 2024-12-12 RX ORDER — PEN NEEDLE, DIABETIC 30 GX3/16"
1 NEEDLE, DISPOSABLE MISCELLANEOUS 4 TIMES DAILY
Qty: 100 EACH | Refills: 5 | Status: SHIPPED | OUTPATIENT
Start: 2024-12-12

## 2024-12-12 RX ORDER — ISOPROPYL ALCOHOL 70 ML/100ML
1 SWAB TOPICAL 4 TIMES DAILY
Qty: 120 EACH | Refills: 2 | Status: SHIPPED | OUTPATIENT
Start: 2024-12-12 | End: 2025-03-12

## 2024-12-12 RX ORDER — INSULIN LISPRO 100 [IU]/ML
12 INJECTION, SOLUTION INTRAVENOUS; SUBCUTANEOUS
Qty: 10.8 ML | Refills: 2 | Status: SHIPPED | OUTPATIENT
Start: 2024-12-12 | End: 2025-03-12

## 2024-12-12 RX ORDER — INSULIN ASPART 100 [IU]/ML
12 INJECTION, SOLUTION INTRAVENOUS; SUBCUTANEOUS
Qty: 10.8 ML | Refills: 2 | Status: SHIPPED | OUTPATIENT
Start: 2024-12-12 | End: 2024-12-12

## 2024-12-12 RX ORDER — INSULIN GLARGINE 100 [IU]/ML
40 INJECTION, SOLUTION SUBCUTANEOUS NIGHTLY
Qty: 12 ML | Refills: 2 | Status: SHIPPED | OUTPATIENT
Start: 2024-12-12 | End: 2024-12-12

## 2024-12-12 RX ORDER — INSULIN GLARGINE 100 [IU]/ML
40 INJECTION, SOLUTION SUBCUTANEOUS NIGHTLY
Qty: 12 ML | Refills: 2 | Status: SHIPPED | OUTPATIENT
Start: 2024-12-12 | End: 2025-03-12

## 2024-12-12 RX ORDER — INSULIN PUMP SYRINGE, 3 ML
EACH MISCELLANEOUS
Qty: 1 EACH | Refills: 0 | Status: SHIPPED | OUTPATIENT
Start: 2024-12-12 | End: 2024-12-12

## 2024-12-12 RX ORDER — INSULIN ASPART 100 [IU]/ML
12 INJECTION, SOLUTION INTRAVENOUS; SUBCUTANEOUS
Qty: 10.8 ML | Refills: 2 | Status: SHIPPED | OUTPATIENT
Start: 2024-12-12 | End: 2024-12-12 | Stop reason: HOSPADM

## 2024-12-12 RX ORDER — PEN NEEDLE, DIABETIC 30 GX3/16"
1 NEEDLE, DISPOSABLE MISCELLANEOUS 4 TIMES DAILY
Qty: 100 EACH | Refills: 5 | Status: SHIPPED | OUTPATIENT
Start: 2024-12-12 | End: 2024-12-12

## 2024-12-12 RX ORDER — INSULIN PUMP SYRINGE, 3 ML
EACH MISCELLANEOUS
Qty: 1 EACH | Refills: 0 | Status: SHIPPED | OUTPATIENT
Start: 2024-12-12 | End: 2025-12-12

## 2024-12-12 RX ORDER — POTASSIUM CHLORIDE 20 MEQ/1
20 TABLET, EXTENDED RELEASE ORAL ONCE
Status: COMPLETED | OUTPATIENT
Start: 2024-12-12 | End: 2024-12-12

## 2024-12-12 RX ORDER — LANCETS
EACH MISCELLANEOUS
Qty: 200 EACH | Refills: 5 | Status: SHIPPED | OUTPATIENT
Start: 2024-12-12

## 2024-12-12 RX ADMIN — INSULIN ASPART 15 UNITS: 100 INJECTION, SOLUTION INTRAVENOUS; SUBCUTANEOUS at 05:12

## 2024-12-12 RX ADMIN — POTASSIUM CHLORIDE 20 MEQ: 1500 TABLET, EXTENDED RELEASE ORAL at 05:12

## 2024-12-12 RX ADMIN — MUPIROCIN: 20 OINTMENT TOPICAL at 09:12

## 2024-12-12 RX ADMIN — INSULIN GLARGINE 26 UNITS: 100 INJECTION, SOLUTION SUBCUTANEOUS at 09:12

## 2024-12-12 RX ADMIN — INSULIN ASPART 15 UNITS: 100 INJECTION, SOLUTION INTRAVENOUS; SUBCUTANEOUS at 09:12

## 2024-12-12 RX ADMIN — INSULIN ASPART 15 UNITS: 100 INJECTION, SOLUTION INTRAVENOUS; SUBCUTANEOUS at 12:12

## 2024-12-12 RX ADMIN — POTASSIUM CHLORIDE 40 MEQ: 1500 TABLET, EXTENDED RELEASE ORAL at 04:12

## 2024-12-12 NOTE — DISCHARGE SUMMARY
[facility] Internal Medicine Discharge Summary    Admitting Physician: Ismael Corcoran MD  Attending Physician: Radames Linares MD  Date of Admit: 12/9/2024  Date of Discharge: 12/12/2024    Condition: Stable  Outcome: Patient tolerated treatment/procedure well without complication and is now ready for discharge.  DISPOSITION: Home or Self Care        Discharge Diagnoses:     Patient Active Problem List   Diagnosis    Type 2 diabetes mellitus with hyperosmolar hyperglycemic state (HHS)    Dysphagia    Weight loss    Moderate malnutrition       Principal Problem:  Type 2 diabetes mellitus with hyperosmolar hyperglycemic state (HHS)    Consultants and Procedures:     Consultants:  IP CONSULT TO HOSPITAL MEDICINE  IP CONSULT TO SOCIAL WORK/CASE MANAGEMENT    Procedures:   * No surgery found *      Brief Admission History:      Roman Najera is a 34-year-old male past medical history type 2 diabetes who presented to the ED with complaints of nausea and vomiting.  Patient is started having nausea nonbloody vomiting 3 days ago with loss of appetite and cramping epigastric pain radiating to left lower quadrant with subjective fevers.  Denied any diarrhea, constipation, or current bloody stools, though he recently has been having black stool for 3 months that has resolved.  He has also been complaining of difficulty swallowing and food getting stuck that passes with water followed by burning chest pain.  He has lost around 30 kg over the last 8 months, during the same time he has been complaining of fatigue.  Patient is supposed to be on metformin and Lantus, he stated that he is not taking any medication.  He denied any smoking, alcohol, or drug abuse. No family history of cancers  In the ED the patient was afebrile, slightly hypertensive.  Lab for significant for glucose of 925, high anion gap metabolic acidosis with CO2 20 and anion gap 24, hyponatremia 131, BUN/creatinine 20.7/1.91, baseline 11/1.31, with hydroxybutyrate  "7, A1c > 14, ABG unremarkable.    Hospital Course with Pertinent Findings:      Patient admitted to ICU for further management of DKA. Anion gap was closed, and he was started on long acting insulin. SLP evaluated swallow issue, found that he having difficulty swallowing because of dry mouth from dehydration.  Patient educated on diabetes and how to administer insulin. Home insulin regimen will be Lantus 40U QHS and Humalog 12U TIDWM. Diabetic supplies given. Vitals stable upon discharge, patient tolerating PO intake. No other issues noted. ED precautions given.    Discharge physical exam:  Vitals  BP: 106/67  Temp: 97.5 °F (36.4 °C)  Temp Source: Oral  Pulse: 71  Resp: 17  SpO2: 99 %  Height: 5' 7" (170.2 cm)  Weight: 58.2 kg (128 lb 4.9 oz)    Physical Exam  Vitals reviewed.   Constitutional:       General: He is not in acute distress.     Appearance: Normal appearance. He is normal weight. He is not ill-appearing or toxic-appearing.   HENT:      Mouth/Throat:      Mouth: Mucous membranes are moist.   Eyes:      Extraocular Movements: Extraocular movements intact.   Cardiovascular:      Rate and Rhythm: Normal rate and regular rhythm.      Pulses: Normal pulses.      Heart sounds: Normal heart sounds. No murmur heard.     No friction rub. No gallop.   Pulmonary:      Effort: Pulmonary effort is normal. No respiratory distress.      Breath sounds: Normal breath sounds. No stridor. No wheezing.   Abdominal:      General: Abdomen is flat. There is no distension.      Palpations: Abdomen is soft. There is no mass.      Tenderness: There is no abdominal tenderness.   Musculoskeletal:         General: Normal range of motion.      Right lower leg: No edema.      Left lower leg: No edema.   Skin:     General: Skin is warm and dry.      Capillary Refill: Capillary refill takes less than 2 seconds.      Coloration: Skin is not jaundiced or pale.      Findings: No bruising.   Neurological:      General: No focal deficit " "present.      Mental Status: He is alert and oriented to person, place, and time.      Cranial Nerves: No cranial nerve deficit.      Sensory: No sensory deficit.      Motor: No weakness.           TIME SPENT ON DISCHARGE: 35 minutes    Discharge Medications:         Medication List        START taking these medications      alcohol swabs Padm  Commonly known as: ALCOHOL PREP PADS  Apply 1 each topically 4 (four) times daily.     blood sugar diagnostic Strp  To check BG 4 times daily, to use with insurance preferred meter     blood-glucose meter kit  To check BG 4 times daily, to use with insurance preferred meter     insulin lispro 100 unit/mL pen  Commonly known as: HumaLOG KwikPen Insulin  Inject 12 Units into the skin 3 (three) times daily with meals.     lancets Misc  To check BG 4 times daily, to use with insurance preferred meter     pen needle, diabetic 32 gauge x 5/32" Ndle  1 each by Misc.(Non-Drug; Combo Route) route 4 (four) times daily.  Replaces: pen needle, diabetic 32 gauge x 1/4" Ndle            CHANGE how you take these medications      LANTUS SOLOSTAR U-100 INSULIN 100 unit/mL (3 mL) Inpn pen  Generic drug: insulin glargine U-100 (Lantus)  Inject 40 Units into the skin every evening.  What changed: how much to take            CONTINUE taking these medications      clotrimazole 1 % cream  Commonly known as: LOTRIMIN  Apply topically 2 (two) times daily. Apply to affected area 2 times daily for 21 days            STOP taking these medications      metFORMIN 500 MG tablet  Commonly known as: GLUCOPHAGE     pen needle, diabetic 32 gauge x 1/4" Ndle  Commonly known as: BD ULTRA-FINE MICRO PEN NEEDLE  Replaced by: pen needle, diabetic 32 gauge x 5/32" Ndle               Where to Get Your Medications        These medications were sent to [place] [id number] [facility] [address]      Phone: [phone]   alcohol swabs Padm  blood " "sugar diagnostic Strp  blood-glucose meter kit  insulin lispro 100 unit/mL pen  lancets Misc  LANTUS SOLOSTAR U-100 INSULIN 100 unit/mL (3 mL) Inpn pen  pen needle, diabetic 32 gauge x 5/32" Ndle         Discharge Instructions:         Roman Najera is being discharged Home or Self Care.    Discharge Procedure Orders   Ambulatory referral/consult to Internal Medicine   Standing Status: Future   Referral Priority: Routine Referral Type: Consultation   Referral Reason: Specialty Services Required   Requested Specialty: Internal Medicine   Number of Visits Requested: 1        Follow-Up Appointments:   Follow-up Information       No, Primary Doctor. Schedule an appointment as soon as possible for a visit in 1 week(s).    Why: Office will call you with new pt appt date and time.                             To address at follow-up:      At this time, Roman Najera is determined to have maximized benefits of IP hospitalization. he is discharged in stable condition with OP f/u recommendations and instructions. All questions answered, and patient verbalized agreement with the POC. They were given return precautions prior to d/c including symptoms that should prompt return to ED or to call PCP. Total time spent of DC of 35 minutes.       Elkin Moore, DO  [facility] Internal Medicine, PGY-III          "

## 2024-12-16 ENCOUNTER — OFFICE VISIT (OUTPATIENT)
Dept: INTERNAL MEDICINE | Facility: CLINIC | Age: 34
End: 2024-12-16

## 2024-12-16 VITALS
DIASTOLIC BLOOD PRESSURE: 76 MMHG | HEART RATE: 94 BPM | RESPIRATION RATE: 20 BRPM | OXYGEN SATURATION: 97 % | TEMPERATURE: 98 F | SYSTOLIC BLOOD PRESSURE: 130 MMHG

## 2024-12-16 DIAGNOSIS — E11.65 UNCONTROLLED TYPE 2 DIABETES MELLITUS WITH HYPERGLYCEMIA: ICD-10-CM

## 2024-12-16 PROCEDURE — 99214 OFFICE O/P EST MOD 30 MIN: CPT | Mod: PBBFAC

## 2024-12-16 NOTE — PROGRESS NOTES
I have reviewed and agree with the resident's findings, including all diagnostic interpretations and plans as written.    Pt here for hospital discharge for DKA.. He was discharged on Lantus 40 U nightly and Lispro 12 U with meals. Will also test for labs with M3QE--lg negative, willl start merformin. Will decrease LA to 35 U and increase lunchtime insulin to 15 U. Pt also with rash today--see media. 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 Likely to resolve on it's own with conservative measures. Rest per resident note.     Jessica Schroeder MD

## 2024-12-16 NOTE — PROGRESS NOTES
"[facility] INTERNAL MEDICINE  OUTPATIENT OFFICE VISIT NOTE    SUBJECTIVE:      Chief Complaint: No chief complaint on file.       HPI: Roman Najera is a 34 y.o. yo male w/ PMH of  has no past medical history on file., who presents for postop visit following ICU admission for DKA..  It appears the patient is new to insulin.  Has not been keeping blood glucose logs.  Current regimen of Lantus of 40 units nightly with Humalog 12 units t.i.d. with meals.  He reports his morning glucoses typically in the 90s but he does not have rare occasions where it is in the 70s.  He has been time sugars also are around 90s.  His dinnertime sugars are 300 or greater.  We will adjust insulin regimen.    Patient also complaining of a diffuse rash in the distribution of his shirt.  It appears to be clear vesicular rash filled with fluid.  No erythema, cross, or signs of infection present.  No color change present.      History:   has no past medical history on file.     Past Surgical History:   has no past surgical history on file.     Family History:  family history is not on file.     Social History:        Allergies:  has No Known Allergies.     Home Medications:  Current Outpatient Medications   Medication Instructions    alcohol swabs (ALCOHOL PREP PADS) PadM 1 each, Topical (Top), 4 times daily    blood sugar diagnostic Strp To check BG 4 times daily, to use with insurance preferred meter    blood-glucose meter kit To check BG 4 times daily, to use with insurance preferred meter    clotrimazole (LOTRIMIN) 1 % cream Topical (Top), 2 times daily, Apply to affected area 2 times daily    insulin lispro (HUMALOG KWIKPEN INSULIN) 12 Units, Subcutaneous, 3 times daily with meals    lancets Misc To check BG 4 times daily, to use with insurance preferred meter    LANTUS SOLOSTAR U-100 INSULIN 40 Units, Subcutaneous, Nightly    pen needle, diabetic 32 gauge x 5/32" Ndle 1 each, Misc.(Non-Drug; Combo Route), 4 times daily    " "    ROS:  Negative for all symptoms other than those listed in HPI         OBJECTIVE:     Vital signs:   There were no vitals taken for this visit.     Physical Examination:  Physical Exam  Cardiovascular:      Rate and Rhythm: Normal rate and regular rhythm.      Heart sounds: No murmur heard.     No friction rub. No gallop.   Pulmonary:      Breath sounds: No stridor. No wheezing, rhonchi or rales.   Chest:      Chest wall: No tenderness.   Abdominal:      General: Abdomen is flat. Bowel sounds are normal. There is no distension.      Palpations: Abdomen is soft. There is no mass.      Tenderness: There is no abdominal tenderness.      Hernia: No hernia is present.   Musculoskeletal:      Right lower leg: No edema.      Left lower leg: No edema.   Skin:     Comments: Small vesicular rash present on shoulders, chest, back, neck.  Spares the face and distal extremity.  None on lower body.           Labs:  BMP:   Lab Results   Component Value Date    CO2 25 12/12/2024    BUN 14.4 12/12/2024    CREATININE 0.87 12/12/2024    GLUCOSE 175 (H) 12/12/2024    CALCIUM 8.6 12/12/2024     CBC:   Lab Results   Component Value Date    WBC 5.17 12/12/2024    HGB 12.7 (L) 12/12/2024    HCT 37.2 (L) 12/12/2024    MCV 86.5 12/12/2024    RDW 11.9 12/12/2024     LFTs:   Lab Results   Component Value Date    LABPROT 5.1 (L) 12/12/2024    ALBUMIN 3.0 (L) 12/12/2024    AST 21 12/12/2024    ALT 19 12/12/2024    ALKPHOS 45 12/12/2024     FLP: No results found for: "CHOL", "HDL", "LDL", "TRIG", "TOTALCHOLEST"  DM:   Lab Results   Component Value Date    HGBA1C >14.0 (H) 12/09/2024    EAG  12/09/2024      Comment:      UAC    CREATININE 0.87 12/12/2024     Thyroid: No results found for: "TSH", "EUHPMT4FCGY", "H5AEBDU", "M5HGTJK", "THYROIDAB"  Anemia: No results found for: "IRON", "TIBC", "FERRITIN", "[id number]", "FOLATE"            ASSESSMENT & PLAN:        Diabetes mellitus on insulin  -Lantus 40 units q.h.s., Humalog 12 units t.i.d. " WM  -we will adjust to Lantus 35 units q.h.s., breakfast 8 units Humalog, lunch 15 units Humalog, dinner 12 units he will log  -A1c greater than 14  -ordered zinc transporter 8 in [facility] 64.    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  -likely explanation is excessive sweating and clogging of extracranial glands    Return to clinic in 3 month(s).    Cm Reyna M.D  [facility] PGY-2